# Patient Record
Sex: FEMALE | Race: WHITE | Employment: OTHER | URBAN - METROPOLITAN AREA
[De-identification: names, ages, dates, MRNs, and addresses within clinical notes are randomized per-mention and may not be internally consistent; named-entity substitution may affect disease eponyms.]

---

## 2018-01-24 ENCOUNTER — APPOINTMENT (OUTPATIENT)
Dept: GENERAL RADIOLOGY | Age: 78
DRG: 194 | End: 2018-01-24
Attending: EMERGENCY MEDICINE
Payer: MEDICARE

## 2018-01-24 ENCOUNTER — HOSPITAL ENCOUNTER (INPATIENT)
Age: 78
LOS: 2 days | Discharge: HOME HEALTH | DRG: 194 | End: 2018-01-26
Attending: EMERGENCY MEDICINE | Admitting: INTERNAL MEDICINE
Payer: MEDICARE

## 2018-01-24 DIAGNOSIS — J98.01 ACUTE BRONCHOSPASM: ICD-10-CM

## 2018-01-24 DIAGNOSIS — J06.9 ACUTE UPPER RESPIRATORY INFECTION: ICD-10-CM

## 2018-01-24 DIAGNOSIS — R09.02 HYPOXIA: Primary | ICD-10-CM

## 2018-01-24 LAB
ANION GAP SERPL CALC-SCNC: 13 MMOL/L (ref 3–18)
APPEARANCE UR: CLEAR
BASOPHILS # BLD: 0 K/UL (ref 0–0.06)
BASOPHILS NFR BLD: 0 % (ref 0–2)
BILIRUB UR QL: NEGATIVE
BUN SERPL-MCNC: 13 MG/DL (ref 7–18)
BUN/CREAT SERPL: 13 (ref 12–20)
CALCIUM SERPL-MCNC: 9.4 MG/DL (ref 8.5–10.1)
CHLORIDE SERPL-SCNC: 95 MMOL/L (ref 100–108)
CO2 SERPL-SCNC: 26 MMOL/L (ref 21–32)
COLOR UR: ABNORMAL
CREAT SERPL-MCNC: 0.99 MG/DL (ref 0.6–1.3)
DIFFERENTIAL METHOD BLD: ABNORMAL
EOSINOPHIL # BLD: 0.1 K/UL (ref 0–0.4)
EOSINOPHIL NFR BLD: 1 % (ref 0–5)
EPITH CASTS URNS QL MICRO: NORMAL /LPF (ref 0–5)
ERYTHROCYTE [DISTWIDTH] IN BLOOD BY AUTOMATED COUNT: 13.3 % (ref 11.6–14.5)
FLUAV AG NPH QL IA: POSITIVE
FLUBV AG NOSE QL IA: NEGATIVE
GLUCOSE SERPL-MCNC: 202 MG/DL (ref 74–99)
GLUCOSE UR STRIP.AUTO-MCNC: NEGATIVE MG/DL
HCT VFR BLD AUTO: 40.2 % (ref 35–45)
HGB BLD-MCNC: 13.3 G/DL (ref 12–16)
HGB UR QL STRIP: NEGATIVE
KETONES UR QL STRIP.AUTO: 40 MG/DL
LACTATE BLD-SCNC: 2.5 MMOL/L (ref 0.4–2)
LEUKOCYTE ESTERASE UR QL STRIP.AUTO: ABNORMAL
LYMPHOCYTES # BLD: 1.3 K/UL (ref 0.9–3.6)
LYMPHOCYTES NFR BLD: 15 % (ref 21–52)
MCH RBC QN AUTO: 26.8 PG (ref 24–34)
MCHC RBC AUTO-ENTMCNC: 33.1 G/DL (ref 31–37)
MCV RBC AUTO: 80.9 FL (ref 74–97)
MONOCYTES # BLD: 0.8 K/UL (ref 0.05–1.2)
MONOCYTES NFR BLD: 9 % (ref 3–10)
NEUTS SEG # BLD: 7 K/UL (ref 1.8–8)
NEUTS SEG NFR BLD: 75 % (ref 40–73)
NITRITE UR QL STRIP.AUTO: NEGATIVE
PH UR STRIP: 7.5 [PH] (ref 5–8)
PLATELET # BLD AUTO: 134 K/UL (ref 135–420)
PMV BLD AUTO: 13.1 FL (ref 9.2–11.8)
POTASSIUM SERPL-SCNC: 3.6 MMOL/L (ref 3.5–5.5)
PROT UR STRIP-MCNC: 30 MG/DL
RBC # BLD AUTO: 4.97 M/UL (ref 4.2–5.3)
RBC #/AREA URNS HPF: NORMAL /HPF (ref 0–5)
SODIUM SERPL-SCNC: 134 MMOL/L (ref 136–145)
SP GR UR REFRACTOMETRY: 1.02 (ref 1–1.03)
UROBILINOGEN UR QL STRIP.AUTO: 1 EU/DL (ref 0.2–1)
WBC # BLD AUTO: 9.2 K/UL (ref 4.6–13.2)
WBC URNS QL MICRO: NORMAL /HPF (ref 0–4)

## 2018-01-24 PROCEDURE — 99285 EMERGENCY DEPT VISIT HI MDM: CPT

## 2018-01-24 PROCEDURE — 81001 URINALYSIS AUTO W/SCOPE: CPT | Performed by: EMERGENCY MEDICINE

## 2018-01-24 PROCEDURE — 65660000000 HC RM CCU STEPDOWN

## 2018-01-24 PROCEDURE — 74011000250 HC RX REV CODE- 250: Performed by: EMERGENCY MEDICINE

## 2018-01-24 PROCEDURE — 80048 BASIC METABOLIC PNL TOTAL CA: CPT | Performed by: EMERGENCY MEDICINE

## 2018-01-24 PROCEDURE — 85025 COMPLETE CBC W/AUTO DIFF WBC: CPT | Performed by: EMERGENCY MEDICINE

## 2018-01-24 PROCEDURE — 87040 BLOOD CULTURE FOR BACTERIA: CPT | Performed by: EMERGENCY MEDICINE

## 2018-01-24 PROCEDURE — 83605 ASSAY OF LACTIC ACID: CPT

## 2018-01-24 PROCEDURE — 77030029684 HC NEB SM VOL KT MONA -A

## 2018-01-24 PROCEDURE — 96360 HYDRATION IV INFUSION INIT: CPT

## 2018-01-24 PROCEDURE — 74011250636 HC RX REV CODE- 250/636: Performed by: EMERGENCY MEDICINE

## 2018-01-24 PROCEDURE — 87804 INFLUENZA ASSAY W/OPTIC: CPT | Performed by: EMERGENCY MEDICINE

## 2018-01-24 PROCEDURE — 94640 AIRWAY INHALATION TREATMENT: CPT

## 2018-01-24 PROCEDURE — 71046 X-RAY EXAM CHEST 2 VIEWS: CPT

## 2018-01-24 PROCEDURE — 74011250637 HC RX REV CODE- 250/637: Performed by: EMERGENCY MEDICINE

## 2018-01-24 RX ORDER — IPRATROPIUM BROMIDE AND ALBUTEROL SULFATE 2.5; .5 MG/3ML; MG/3ML
3 SOLUTION RESPIRATORY (INHALATION) ONCE
Status: COMPLETED | OUTPATIENT
Start: 2018-01-24 | End: 2018-01-24

## 2018-01-24 RX ORDER — SODIUM CHLORIDE 9 MG/ML
75 INJECTION, SOLUTION INTRAVENOUS CONTINUOUS
Status: DISCONTINUED | OUTPATIENT
Start: 2018-01-24 | End: 2018-01-26 | Stop reason: HOSPADM

## 2018-01-24 RX ORDER — LOSARTAN POTASSIUM 25 MG/1
25 TABLET ORAL DAILY
COMMUNITY
End: 2021-11-14

## 2018-01-24 RX ORDER — OSELTAMIVIR PHOSPHATE 75 MG/1
75 CAPSULE ORAL
Status: COMPLETED | OUTPATIENT
Start: 2018-01-24 | End: 2018-01-24

## 2018-01-24 RX ORDER — ACETAMINOPHEN 325 MG/1
650 TABLET ORAL ONCE
Status: COMPLETED | OUTPATIENT
Start: 2018-01-24 | End: 2018-01-24

## 2018-01-24 RX ORDER — GLUCOSAMINE SULFATE 1500 MG
POWDER IN PACKET (EA) ORAL DAILY
COMMUNITY
End: 2021-11-14

## 2018-01-24 RX ORDER — SIMVASTATIN 5 MG/1
5 TABLET, FILM COATED ORAL
COMMUNITY
End: 2021-11-14

## 2018-01-24 RX ORDER — ALBUTEROL SULFATE 0.83 MG/ML
2.5 SOLUTION RESPIRATORY (INHALATION)
Status: COMPLETED | OUTPATIENT
Start: 2018-01-24 | End: 2018-01-24

## 2018-01-24 RX ADMIN — ACETAMINOPHEN 650 MG: 325 TABLET ORAL at 17:21

## 2018-01-24 RX ADMIN — IPRATROPIUM BROMIDE AND ALBUTEROL SULFATE 3 ML: .5; 3 SOLUTION RESPIRATORY (INHALATION) at 18:28

## 2018-01-24 RX ADMIN — SODIUM CHLORIDE 1000 ML: 900 INJECTION, SOLUTION INTRAVENOUS at 17:21

## 2018-01-24 RX ADMIN — OSELTAMIVIR PHOSPHATE 75 MG: 75 CAPSULE ORAL at 19:06

## 2018-01-24 RX ADMIN — ALBUTEROL SULFATE 2.5 MG: 2.5 SOLUTION RESPIRATORY (INHALATION) at 21:44

## 2018-01-24 RX ADMIN — METHYLPREDNISOLONE SODIUM SUCCINATE 125 MG: 125 INJECTION, POWDER, FOR SOLUTION INTRAMUSCULAR; INTRAVENOUS at 19:06

## 2018-01-24 RX ADMIN — SODIUM CHLORIDE 125 ML/HR: 9 INJECTION, SOLUTION INTRAVENOUS at 19:07

## 2018-01-24 NOTE — IP AVS SNAPSHOT
[address] Patient: Samantha Najera MRN: [id number] OGN:[birth year] A check bairon indicates which time of day the medication should be taken. My Medications START taking these medications Instructions Each Dose to Equal  
 Morning Noon Evening Bedtime  
 acetaminophen 500 mg tablet Commonly known as:  TYLENOL Your last dose was: Your next dose is: Take 1 Tab by mouth every four (4) hours as needed. Indications: Fever, Pain 500 mg  
    
   
   
   
  
 albuterol 90 mcg/actuation inhaler Commonly known as:  PROVENTIL HFA, VENTOLIN HFA, PROAIR HFA Your last dose was: Your next dose is:    
   
   
 2 PUFFS EVERY 4 HOURS while awake for 3 days then every 4 hours as needed for shortness of breath and/or wheezing  
     
   
   
   
  
 benzonatate 100 mg capsule Commonly known as:  TESSALON Your last dose was: Your next dose is: Take 1 Cap by mouth three (3) times daily as needed for Cough. 100 mg  
    
   
   
   
  
 budesonide-formoterol 80-4.5 mcg/actuation Hfaa Commonly known as:  SYMBICORT Your last dose was: Your next dose is: Take 2 Puffs by inhalation two (2) times a day. 2 Puff  
    
   
   
   
  
 ondansetron 4 mg disintegrating tablet Commonly known as:  ZOFRAN ODT Your last dose was: Your next dose is: Take 1 Tab by mouth every eight (8) hours as needed for Nausea. 4 mg  
    
   
   
   
  
 oseltamivir 75 mg capsule Commonly known as:  TAMIFLU Your last dose was: Your next dose is: Take 1 Cap by mouth two (2) times a day for 3 days. Indications: INFLUENZA 75 mg CONTINUE taking these medications Instructions Each Dose to Equal  
 Morning Noon Evening Bedtime cholecalciferol 1,000 unit Cap Commonly known as:  VITAMIN D3 Your last dose was: Your next dose is: Take  by mouth daily. losartan 25 mg tablet Commonly known as:  COZAAR Your last dose was: Your next dose is: Take 25 mg by mouth daily. Indications: hypertension 25 mg METFORMIN PO Your last dose was: Your next dose is: Take  by mouth. OTHER Your last dose was: Your next dose is:    
   
   
 Cholesterol med  
     
   
   
   
  
 simvastatin 5 mg tablet Commonly known as:  ZOCOR Your last dose was: Your next dose is: Take 5 mg by mouth nightly. 5 mg Where to Get Your Medications Information on where to get these meds will be given to you by the nurse or doctor. ! Ask your nurse or doctor about these medications  
  acetaminophen 500 mg tablet  
 albuterol 90 mcg/actuation inhaler  
 benzonatate 100 mg capsule  
 budesonide-formoterol 80-4.5 mcg/actuation Hfaa  
 ondansetron 4 mg disintegrating tablet  
 oseltamivir 75 mg capsule

## 2018-01-24 NOTE — ED PROVIDER NOTES
EMERGENCY DEPARTMENT HISTORY AND PHYSICAL EXAM    4:49 PM      Date: 1/24/2018  Patient Name: Carolina Manrique    History of Presenting Illness     Chief Complaint   Patient presents with    Shortness of Breath         History Provided By: Patient and EMS    Chief Complaint: Emesis  Duration: 18 Hours  Timing:  Constant  Location:   Quality: Multiple episodic  Severity: Mild  Modifying Factors: could not get out of bed  Associated Symptoms: denies any other associated signs or symptoms      Additional History (Context): Carolina Manrique is a 78 y.o. female with hx of asthma, DM, and HTN who presents to ED via EMT with c/o constant mild multiple episodic emesis onset 18 hours. Per EMT, pt had multiple episodes of emesis last night which started with SOB. EMT reports noticeable pt wheezing upon arrival and gave 1 Albuterol Neb and 1 Duo Neb. Pt denies any associated sx including cough, rhinorrhea, difficulty breathing, or pain. Pt states she fell out of bed last night and could not get up. Family at beside state pt has been complaining of cold sx, chest congestion, and cough onset 5 days and today a neighbor noticed the pt was weak and lethargic and called EMS. Pt continues to deny specific complaints but family states pt appears more weak than baseline. Allergy to ASA. PCP: Ladarius Lacy MD        Past History     Past Medical History:  Past Medical History:   Diagnosis Date    Asthma     Diabetes ([address]     Hypertension        Past Surgical History:  Past Surgical History:   Procedure Laterality Date    HX CHOLECYSTECTOMY         Family History:  History reviewed. No pertinent family history. Social History:  Social History   Substance Use Topics    Smoking status: None    Smokeless tobacco: None    Alcohol use None       Allergies: Allergies   Allergen Reactions    Aspirin Anaphylaxis         Review of Systems       Review of Systems   Constitutional: Negative for fever.    HENT: Negative for rhinorrhea. Eyes: Negative for visual disturbance. Respiratory: Positive for wheezing. Negative for cough and shortness of breath. Cardiovascular: Negative for chest pain and leg swelling. Gastrointestinal: Negative for abdominal pain, blood in stool and vomiting. Genitourinary: Negative for dysuria and flank pain. Musculoskeletal: Negative for arthralgias and neck pain. Skin: Negative for rash. Neurological: Negative for headaches. Hematological: Does not bruise/bleed easily. Psychiatric/Behavioral: Negative for confusion. All other systems reviewed and are negative. Physical Exam     Visit Vitals    /56    Pulse 92    Temp (!) 102 °F (38.9 °C)    Resp 18    Ht 4' 11\" (1.499 m)    Wt 70.8 kg (156 lb)    SpO2 97%    BMI 31.51 kg/m2         Physical Exam   Constitutional: She is oriented to person, place, and time. She appears well-developed and well-nourished. No distress. HENT:   Head: Normocephalic and atraumatic. Right Ear: External ear normal.   Left Ear: External ear normal.   Nose: Nose normal.   Mouth/Throat: Oropharynx is clear and moist.   Eyes: Conjunctivae and EOM are normal. Pupils are equal, round, and reactive to light. No scleral icterus. Neck: Normal range of motion. Neck supple. No JVD present. No tracheal deviation present. No thyromegaly present. Cardiovascular: Regular rhythm, normal heart sounds and intact distal pulses. Tachycardia present. Exam reveals no gallop and no friction rub. No murmur heard. Pulmonary/Chest: Effort normal. She has wheezes (scattered at bilateral bases). She has rhonchi (scattered at bilateral bases). She exhibits no tenderness. Abdominal: Soft. Bowel sounds are normal. She exhibits no distension. There is no tenderness. There is no rebound and no guarding. Musculoskeletal: Normal range of motion. She exhibits no edema or tenderness. Lymphadenopathy:     She has no cervical adenopathy.    Neurological: She is alert and oriented to person, place, and time. No cranial nerve deficit. Coordination normal.   No sensory loss, Gait normal, Motor 5/5   Skin: Skin is warm and dry. Psychiatric: She has a normal mood and affect. Her behavior is normal. Judgment and thought content normal.   Nursing note and vitals reviewed. Diagnostic Study Results     Labs -  Recent Results (from the past 12 hour(s))   METABOLIC PANEL, BASIC    Collection Time: 01/24/18  5:15 PM   Result Value Ref Range    Sodium 134 (L) 136 - 145 mmol/L    Potassium 3.6 3.5 - 5.5 mmol/L    Chloride 95 (L) 100 - 108 mmol/L    CO2 26 21 - 32 mmol/L    Anion gap 13 3.0 - 18 mmol/L    Glucose 202 (H) 74 - 99 mg/dL    BUN 13 7.0 - 18 MG/DL    Creatinine 0.99 0.6 - 1.3 MG/DL    BUN/Creatinine ratio 13 12 - 20      GFR est AA >60 >60 ml/min/1.73m2    GFR est non-AA 55 (L) >60 ml/min/1.73m2    Calcium 9.4 8.5 - 10.1 MG/DL   CBC WITH AUTOMATED DIFF    Collection Time: 01/24/18  5:15 PM   Result Value Ref Range    WBC 9.2 4.6 - 13.2 K/uL    RBC 4.97 4.20 - 5.30 M/uL    HGB 13.3 12.0 - 16.0 g/dL    HCT 40.2 35.0 - 45.0 %    MCV 80.9 74.0 - 97.0 FL    MCH 26.8 24.0 - 34.0 PG    MCHC 33.1 31.0 - 37.0 g/dL    RDW 13.3 11.6 - 14.5 %    PLATELET 718 (L) 835 - 420 K/uL    MPV 13.1 (H) 9.2 - 11.8 FL    NEUTROPHILS 75 (H) 40 - 73 %    LYMPHOCYTES 15 (L) 21 - 52 %    MONOCYTES 9 3 - 10 %    EOSINOPHILS 1 0 - 5 %    BASOPHILS 0 0 - 2 %    ABS. NEUTROPHILS 7.0 1.8 - 8.0 K/UL    ABS. LYMPHOCYTES 1.3 0.9 - 3.6 K/UL    ABS. MONOCYTES 0.8 0.05 - 1.2 K/UL    ABS. EOSINOPHILS 0.1 0.0 - 0.4 K/UL    ABS.  BASOPHILS 0.0 0.0 - 0.06 K/UL    DF AUTOMATED     POC LACTIC ACID    Collection Time: 01/24/18  5:30 PM   Result Value Ref Range    Lactic Acid (POC) 2.5 (HH) 0.4 - 2.0 mmol/L       Radiologic Studies -   XR CHEST AP LAT   Final Result   IMPRESSIONS:     Moderate bronchitis.     Mild streaky atelectasis or fibrotic changes in left lung base, with or without  subtle infiltrates.     No evidence of confluent pneumonia, confluent pulmonary atelectasis or pleural  effusion.           Normal cardiac size, without evidence of CHF. Medical Decision Making   I am the first provider for this patient. I reviewed the vital signs, available nursing notes, past medical history, past surgical history, family history and social history. Vital Signs-Reviewed the patient's vital signs. Cardiac Monitor:  Rate: 106 bpm  Rhythm:  Sinus Tachycardia     ED Course:  6:36 PM Consult: I discussed care with Dr. Jarvis Peña (Hospitalist). It was a standard discussion including patient history, chief complaint, available diagnostic results, and predicted treatment course. Agrees with admission. Records Reviewed: Nursing Notes (Time of Review: 4:46 PM)    Provider Notes (Medical Decision Making): viral vs bacterial pneumonia: most likely viral    For Hospitalized Patients:    1. Hospitalization Decision Time:  The decision to hospitalize the patient was made by Dr. Gillian Frye at 6:35 PM on 1/24/2018    2. Aspirin: Aspirin was not given because the patient is allergic    Diagnosis     Clinical Impression: No diagnosis found. Disposition: Admit    Follow-up Information     None           Patient's Medications   Start Taking    No medications on file   Continue Taking    CHOLECALCIFEROL (VITAMIN D3) 1,000 UNIT CAP    Take  by mouth daily. METFORMIN HCL (METFORMIN PO)    Take  by mouth.     OTHER    Cholesterol med   These Medications have changed    No medications on file   Stop Taking    No medications on file     _______________________________    Attestations:  [id number] acting as a scribe for and in the presence of Arnaud Oreilly MD      January 24, 2018 at 4:46 PM       Provider Attestation:      I personally performed the services described in the documentation, reviewed the documentation, as recorded by the scribe in my presence, and it accurately and completely records my words and actions. January 24, 2018 at 4:46 PM - Bozena Chavez MD    _______________________________  PT has persistant hypoxia and bronchospasm, CXR shows no pneumonia and likely viral URI. PT is feeling much better after IVF and O2 supplement. Will admit her due to continued need for O2 supplement. Pt is not septic at this time. This appears consistent with a viral URI and exacerbation of asthma. I've discussed this with hospitalist who agrees with this plan.   Bozena Chavez MD  6:48 PM

## 2018-01-24 NOTE — IP AVS SNAPSHOT
[address] [address] Vani Tyson Patient: Keyshawn Pires MRN: [id number] PBS:[id number] About your hospitalization You were admitted on:  January 24, 2018 You last received care in the:  [address] [place] [address] You were discharged on:  January 26, 2018 Why you were hospitalized Your primary diagnosis was:  Not on File Your diagnoses also included:  Bronchospasm, Hypoxia, Influenza A (H5n1) Follow-up Information Follow up With Details Comments Contact Info Odalys Brumfield MD On 1/30/2018 @1100am Mary[address] [facility] 
[phone] Your Scheduled Appointments Tuesday January 30, 2018 11:30 AM EST Office Visit with Odalys Brumfield MD  
[facility], [facility] [address] [place] [address]  
[phone] Wednesday January 31, 2018  1:00 PM EST New Patient with MD Kelsea Kee ([facility] [address] [place] [address]  
[phone] Thursday February 01, 2018  9:30 AM EST  
DEXA BONE DENSITY STDY AXIAL with HBV BONE DEXA RM 1 HBV RAD BONE DENSITY ([facility]) [address] [facility] [address] [id number] [address] [address] [phone] OUTSIDE FILMS  - Any outside films related to the study being scheduled should be brought with you on the day of the exam.  If this cannot be done there may be a delay in the reading of the study. MEDICATIONS  - Patient must bring a complete list of all medications currently taking to include prescriptions, over-the-counter meds, herbals, vitamins & any dietary supplements - Patient must discontinue use of calcium, vitamins, or calcium supplements including antacids (calcium based) 24 hours before scan.   GENERAL INSTRUCTIONS  - [facility] cannot accommodate patients on stretchers, patient must be able to walk into the room and be able to sit up for a portion of the exam.  
  
    
CHECK IN INSTRUCTIONS  Check in to Registration desk 30 minutes prior to your appointment. [address] [facility] [address] (located on the second floor) Sony [address]ru[address], [address] Discharge Orders None A check bairon indicates which time of day the medication should be taken. My Medications START taking these medications Instructions Each Dose to Equal  
 Morning Noon Evening Bedtime  
 acetaminophen 500 mg tablet Commonly known as:  TYLENOL Your last dose was: Your next dose is: Take 1 Tab by mouth every four (4) hours as needed. Indications: Fever, Pain 500 mg  
    
   
   
   
  
 albuterol 90 mcg/actuation inhaler Commonly known as:  PROVENTIL HFA, VENTOLIN HFA, PROAIR HFA Your last dose was: Your next dose is:    
   
   
 2 PUFFS EVERY 4 HOURS while awake for 3 days then every 4 hours as needed for shortness of breath and/or wheezing  
     
   
   
   
  
 benzonatate 100 mg capsule Commonly known as:  TESSALON Your last dose was: Your next dose is: Take 1 Cap by mouth three (3) times daily as needed for Cough. 100 mg  
    
   
   
   
  
 budesonide-formoterol 80-4.5 mcg/actuation Hfaa Commonly known as:  SYMBICORT Your last dose was: Your next dose is: Take 2 Puffs by inhalation two (2) times a day. 2 Puff  
    
   
   
   
  
 ondansetron 4 mg disintegrating tablet Commonly known as:  ZOFRAN ODT Your last dose was: Your next dose is: Take 1 Tab by mouth every eight (8) hours as needed for Nausea. 4 mg  
    
   
   
   
  
 oseltamivir 75 mg capsule Commonly known as:  TAMIFLU Your last dose was: Your next dose is: Take 1 Cap by mouth two (2) times a day for 3 days. Indications: INFLUENZA 75 mg CONTINUE taking these medications Instructions Each Dose to Equal  
 Morning Noon Evening Bedtime  
 cholecalciferol 1,000 unit Cap Commonly known as:  VITAMIN D3 Your last dose was: Your next dose is: Take  by mouth daily. losartan 25 mg tablet Commonly known as:  COZAAR Your last dose was: Your next dose is: Take 25 mg by mouth daily. Indications: hypertension 25 mg METFORMIN PO Your last dose was: Your next dose is: Take  by mouth. OTHER Your last dose was: Your next dose is:    
   
   
 Cholesterol med  
     
   
   
   
  
 simvastatin 5 mg tablet Commonly known as:  ZOCOR Your last dose was: Your next dose is: Take 5 mg by mouth nightly. 5 mg Where to Get Your Medications Information on where to get these meds will be given to you by the nurse or doctor. ! Ask your nurse or doctor about these medications  
  acetaminophen 500 mg tablet  
 albuterol 90 mcg/actuation inhaler  
 benzonatate 100 mg capsule  
 budesonide-formoterol 80-4.5 mcg/actuation Hfaa  
 ondansetron 4 mg disintegrating tablet  
 oseltamivir 75 mg capsule Discharge Instructions Influenza (Flu): Care Instructions Your Care Instructions Influenza (flu) is an infection in the lungs and breathing passages. It is caused by the influenza virus. There are different strains, or types, of the flu virus from year to year. Unlike the common cold, the flu comes on suddenly and the symptoms, such as a cough, congestion, fever, chills, fatigue, aches, and pains, are more severe.  These symptoms may last up to 10 days. Although the flu can make you feel very sick, it usually doesn't cause serious health problems. Home treatment is usually all you need for flu symptoms. But your doctor may prescribe antiviral medicine to prevent other health problems, such as pneumonia, from developing. Older people and those who have a long-term health condition, such as lung disease, are most at risk for having pneumonia or other health problems. Follow-up care is a key part of your treatment and safety. Be sure to make and go to all appointments, and call your doctor if you are having problems. It's also a good idea to know your test results and keep a list of the medicines you take. How can you care for yourself at home? · Get plenty of rest. 
· Drink plenty of fluids, enough so that your urine is light yellow or clear like water. If you have kidney, heart, or liver disease and have to limit fluids, talk with your doctor before you increase the amount of fluids you drink. · Take an over-the-counter pain medicine if needed, such as acetaminophen (Tylenol), ibuprofen (Advil, Motrin), or naproxen (Aleve), to relieve fever, headache, and muscle aches. Read and follow all instructions on the label. No one younger than 20 should take aspirin. It has been linked to Reye syndrome, a serious illness. · Do not smoke. Smoking can make the flu worse. If you need help quitting, talk to your doctor about stop-smoking programs and medicines. These can increase your chances of quitting for good. · Breathe moist air from a hot shower or from a sink filled with hot water to help clear a stuffy nose. · Before you use cough and cold medicines, check the label. These medicines may not be safe for young children or for people with certain health problems. · If the skin around your nose and lips becomes sore, put some petroleum jelly on the area. · To ease coughing: ¨ Drink fluids to soothe a scratchy throat. ¨ Suck on cough drops or plain hard candy. ¨ Take an over-the-counter cough medicine that contains dextromethorphan to help you get some sleep. Read and follow all instructions on the label. ¨ Raise your head at night with an extra pillow. This may help you rest if coughing keeps you awake. · Take any prescribed medicine exactly as directed. Call your doctor if you think you are having a problem with your medicine. To avoid spreading the flu · Wash your hands regularly, and keep your hands away from your face. · Stay home from school, work, and other public places until you are feeling better and your fever has been gone for at least 24 hours. The fever needs to have gone away on its own without the help of medicine. · Ask people living with you to talk to their doctors about preventing the flu. They may get antiviral medicine to keep from getting the flu from you. · To prevent the flu in the future, get a flu vaccine every fall. Encourage people living with you to get the vaccine. · Cover your mouth when you cough or sneeze. When should you call for help? Call 911 anytime you think you may need emergency care. For example, call if: 
? · You have severe trouble breathing. ?Call your doctor now or seek immediate medical care if: 
? · You have new or worse trouble breathing. ? · You seem to be getting much sicker. ? · You feel very sleepy or confused. ? · You have a new or higher fever. ? · You get a new rash. ? Watch closely for changes in your health, and be sure to contact your doctor if: 
? · You begin to get better and then get worse. ? · You are not getting better after 1 week. Where can you learn more? Go to http://jacqueline-sharon.info/. Enter K518 in the search box to learn more about \"Influenza (Flu): Care Instructions. \" Current as of: May 12, 2017 Content Version: 11.4 © 6716-7314 Healthwise, Incorporated.  Care instructions adapted under license by [address] (which disclaims liability or warranty for this information). If you have questions about a medical condition or this instruction, always ask your healthcare professional. [address] any warranty or liability for your use of this information. DISCHARGE SUMMARY from Nurse PATIENT INSTRUCTIONS: 
 
 
F-face looks uneven A-arms unable to move or move unevenly S-speech slurred or non-existent T-time-call 911 as soon as signs and symptoms begin-DO NOT go Back to bed or wait to see if you get better-TIME IS BRAIN. Warning Signs of HEART ATTACK Call 911 if you have these symptoms: 
? Chest discomfort. Most heart attacks involve discomfort in the center of the chest that lasts more than a few minutes, or that goes away and comes back. It can feel like uncomfortable pressure, squeezing, fullness, or pain. ? Discomfort in other areas of the upper body. Symptoms can include pain or discomfort in one or both arms, the back, neck, jaw, or stomach. ? Shortness of breath with or without chest discomfort. ? Other signs may include breaking out in a cold sweat, nausea, or lightheadedness. Don't wait more than five minutes to call [address]! Fast action can save your life. Calling 911 is almost always the fastest way to get lifesaving treatment. Emergency Medical Services staff can begin treatment when they arrive  up to an hour sooner than if someone gets to the hospital by car. The discharge information has been reviewed with the patient. The patient verbalized understanding.  
Discharge medications reviewed with the patient and appropriate educational materials and side effects teaching were provided. Patient armband removed and shredded. ___________________________________________________________________________________________________________________________________ MyChart Announcement We are excited to announce that we are making your provider's discharge notes available to you in Cytoguide. You will see these notes when they are completed and signed by the physician that discharged you from your recent hospital stay. If you have any questions or concerns about any information you see in PerMicrot, please call the Health Information Department where you were seen or reach out to your Primary Care Provider for more information about your plan of care. Introducing [facility]! [employer] introduces Cytoguide patient portal. Now you can access parts of your medical record, email your doctor's office, and request medication refills online. 1. In your internet browser, go to [email or web address] [employer]/Waitsupt 2. Click on the First Time User? Click Here link in the Sign In box. You will see the New Member Sign Up page. 3. Enter your Cytoguide Access Code exactly as it appears below. You will not need to use this code after youve completed the sign-up process. If you do not sign up before the expiration date, you must request a new code. · Cytoguide Access Code: [email or web address] Expires: 4/25/2018  9:14 AM 
 
4. Enter the last four digits of your Social Security Number (xxxx) and Date of Birth (mm/dd/yyyy) as indicated and click Submit. You will be taken to the next sign-up page. 5. Create a Cytoguide ID. This will be your Cytoguide login ID and cannot be changed, so think of one that is secure and easy to remember. 6. Create a MOBEXOharMarkerly password. You can change your password at any time. 7. Enter your Password Reset Question and Answer. This can be used at a later time if you forget your password. 8. Enter your e-mail address. You will receive e-mail notification when new information is available in [address]. 9. Click Sign Up. You can now view and download portions of your medical record. 10. Click the Download Summary menu link to download a portable copy of your medical information. If you have questions, please visit the Frequently Asked Questions section of the [employer] website. Remember, DediServe is NOT to be used for urgent needs. For medical emergencies, dial 911. Now available from your iPhone and Android! Unresulted Labs-Please follow up with your PCP about these lab tests Order Current Status CULTURE, BLOOD Preliminary result Providers Seen During Your Hospitalization Provider Specialty Primary office phone Russel Torres MD Emergency Medicine [phone] Angela Lopez MD Internal Medicine [phone] Tristen Young MD Internal Medicine [phone] Your Primary Care Physician (PCP) Primary Care Physician Office Phone Office Fax OTHER, PHYS ** None ** ** None ** You are allergic to the following Allergen Reactions Aspirin Anaphylaxis Recent Documentation Height Weight BMI Smoking Status 1.499 m 70.2 kg 31.25 kg/m2 Never Assessed Emergency Contacts Name Discharge Info Relation Home Work [employer] ( Please Call First Granddaughter) DISCHARGE CAREGIVER [3] Other Relative [6]  [phone] Denise Rowland NO [2] Daughter [21] [phone] Kristyn Harley NO [2] Daughter [21] [phone] Patient Belongings The following personal items are in your possession at time of discharge: 
  Dental Appliances: None  Visual Aid: Glasses, With patient      Home Medications: None   Jewelry: None  Clothing: Pajamas, Pants, Shirt, Socks, Sweater, Undergarments, Footwear, At bedside    Other Valuables: Britney, IdenIve Discharge Instructions Attachments/References INFLUENZA (ENGLISH) Patient Handouts Influenza (Flu): Care Instructions Your Care Instructions Influenza (flu) is an infection in the lungs and breathing passages. It is caused by the influenza virus. There are different strains, or types, of the flu virus from year to year. Unlike the common cold, the flu comes on suddenly and the symptoms, such as a cough, congestion, fever, chills, fatigue, aches, and pains, are more severe. These symptoms may last up to 10 days. Although the flu can make you feel very sick, it usually doesn't cause serious health problems. Home treatment is usually all you need for flu symptoms. But your doctor may prescribe antiviral medicine to prevent other health problems, such as pneumonia, from developing. Older people and those who have a long-term health condition, such as lung disease, are most at risk for having pneumonia or other health problems. Follow-up care is a key part of your treatment and safety. Be sure to make and go to all appointments, and call your doctor if you are having problems. It's also a good idea to know your test results and keep a list of the medicines you take. How can you care for yourself at home? · Get plenty of rest. 
· Drink plenty of fluids, enough so that your urine is light yellow or clear like water. If you have kidney, heart, or liver disease and have to limit fluids, talk with your doctor before you increase the amount of fluids you drink. · Take an over-the-counter pain medicine if needed, such as acetaminophen (Tylenol), ibuprofen (Advil, Motrin), or naproxen (Aleve), to relieve fever, headache, and muscle aches. Read and follow all instructions on the label. No one younger than 20 should take aspirin. It has been linked to Reye syndrome, a serious illness. · Do not smoke. Smoking can make the flu worse.  If you need help quitting, talk to your doctor about stop-smoking programs and medicines. These can increase your chances of quitting for good. · Breathe moist air from a hot shower or from a sink filled with hot water to help clear a stuffy nose. · Before you use cough and cold medicines, check the label. These medicines may not be safe for young children or for people with certain health problems. · If the skin around your nose and lips becomes sore, put some petroleum jelly on the area. · To ease coughing: ¨ Drink fluids to soothe a scratchy throat. ¨ Suck on cough drops or plain hard candy. ¨ Take an over-the-counter cough medicine that contains dextromethorphan to help you get some sleep. Read and follow all instructions on the label. ¨ Raise your head at night with an extra pillow. This may help you rest if coughing keeps you awake. · Take any prescribed medicine exactly as directed. Call your doctor if you think you are having a problem with your medicine. To avoid spreading the flu · Wash your hands regularly, and keep your hands away from your face. · Stay home from school, work, and other public places until you are feeling better and your fever has been gone for at least 24 hours. The fever needs to have gone away on its own without the help of medicine. · Ask people living with you to talk to their doctors about preventing the flu. They may get antiviral medicine to keep from getting the flu from you. · To prevent the flu in the future, get a flu vaccine every fall. Encourage people living with you to get the vaccine. · Cover your mouth when you cough or sneeze. When should you call for help? Call 911 anytime you think you may need emergency care. For example, call if: 
? · You have severe trouble breathing. ?Call your doctor now or seek immediate medical care if: 
? · You have new or worse trouble breathing. ? · You seem to be getting much sicker. ? · You feel very sleepy or confused. ? · You have a new or higher fever. ? · You get a new rash. ? Watch closely for changes in your health, and be sure to contact your doctor if: 
? · You begin to get better and then get worse. ? · You are not getting better after 1 week. Where can you learn more? Go to http://jacqueline-sharon.info/. Enter J355 in the search box to learn more about \"Influenza (Flu): Care Instructions. \" Current as of: May 12, 2017 Content Version: 11.4 © 2110-6666 Healthwise, Incorporated. Care instructions adapted under license by [employer] (which disclaims liability or warranty for this information). If you have questions about a medical condition or this instruction, always ask your healthcare professional. [address] any warranty or liability for your use of this information. Please provide this summary of care documentation to your next provider. Signatures-by signing, you are acknowledging that this After Visit Summary has been reviewed with you and you have received a copy. Patient Signature:  ____________________________________________________________ Date:  ____________________________________________________________  
  
Lona Carey Provider Signature:  ____________________________________________________________ Date:  ____________________________________________________________

## 2018-01-24 NOTE — ED TRIAGE NOTES
Pt had multiple  Episodes of vomiting last pm,  This am  Started with sob  Per EMS pt with noted sob  On their arrival  Pt given one alb neb and one duoneb , pt states feeling relief with nebs

## 2018-01-25 PROBLEM — J09.X2 INFLUENZA A (H5N1): Status: ACTIVE | Noted: 2018-01-25

## 2018-01-25 LAB
ALBUMIN SERPL-MCNC: 3 G/DL (ref 3.4–5)
ALBUMIN/GLOB SERPL: 0.8 {RATIO} (ref 0.8–1.7)
ALP SERPL-CCNC: 53 U/L (ref 45–117)
ALT SERPL-CCNC: 26 U/L (ref 13–56)
ANION GAP SERPL CALC-SCNC: 9 MMOL/L (ref 3–18)
AST SERPL-CCNC: 21 U/L (ref 15–37)
BASOPHILS # BLD: 0 K/UL (ref 0–0.1)
BASOPHILS NFR BLD: 0 % (ref 0–2)
BILIRUB SERPL-MCNC: 0.5 MG/DL (ref 0.2–1)
BUN SERPL-MCNC: 16 MG/DL (ref 7–18)
BUN/CREAT SERPL: 18 (ref 12–20)
CALCIUM SERPL-MCNC: 8 MG/DL (ref 8.5–10.1)
CHLORIDE SERPL-SCNC: 103 MMOL/L (ref 100–108)
CO2 SERPL-SCNC: 24 MMOL/L (ref 21–32)
CREAT SERPL-MCNC: 0.9 MG/DL (ref 0.6–1.3)
DIFFERENTIAL METHOD BLD: ABNORMAL
EOSINOPHIL # BLD: 0 K/UL (ref 0–0.4)
EOSINOPHIL NFR BLD: 0 % (ref 0–5)
ERYTHROCYTE [DISTWIDTH] IN BLOOD BY AUTOMATED COUNT: 13.2 % (ref 11.6–14.5)
EST. AVERAGE GLUCOSE BLD GHB EST-MCNC: 160 MG/DL
GLOBULIN SER CALC-MCNC: 3.7 G/DL (ref 2–4)
GLUCOSE BLD STRIP.AUTO-MCNC: 104 MG/DL (ref 70–110)
GLUCOSE BLD STRIP.AUTO-MCNC: 112 MG/DL (ref 70–110)
GLUCOSE BLD STRIP.AUTO-MCNC: 336 MG/DL (ref 70–110)
GLUCOSE BLD STRIP.AUTO-MCNC: 70 MG/DL (ref 70–110)
GLUCOSE SERPL-MCNC: 383 MG/DL (ref 74–99)
HBA1C MFR BLD: 7.2 % (ref 4.2–5.6)
HCT VFR BLD AUTO: 33.5 % (ref 35–45)
HGB BLD-MCNC: 11 G/DL (ref 12–16)
LACTATE SERPL-SCNC: 3 MMOL/L (ref 0.4–2)
LYMPHOCYTES # BLD: 0.4 K/UL (ref 0.9–3.6)
LYMPHOCYTES NFR BLD: 6 % (ref 21–52)
MAGNESIUM SERPL-MCNC: 1.6 MG/DL (ref 1.6–2.6)
MCH RBC QN AUTO: 26.8 PG (ref 24–34)
MCHC RBC AUTO-ENTMCNC: 32.8 G/DL (ref 31–37)
MCV RBC AUTO: 81.7 FL (ref 74–97)
MONOCYTES # BLD: 0.1 K/UL (ref 0.05–1.2)
MONOCYTES NFR BLD: 1 % (ref 3–10)
NEUTS SEG # BLD: 5.2 K/UL (ref 1.8–8)
NEUTS SEG NFR BLD: 93 % (ref 40–73)
PHOSPHATE SERPL-MCNC: 2.8 MG/DL (ref 2.5–4.9)
PLATELET # BLD AUTO: 129 K/UL (ref 135–420)
PMV BLD AUTO: 13.3 FL (ref 9.2–11.8)
POTASSIUM SERPL-SCNC: 3.1 MMOL/L (ref 3.5–5.5)
PROT SERPL-MCNC: 6.7 G/DL (ref 6.4–8.2)
RBC # BLD AUTO: 4.1 M/UL (ref 4.2–5.3)
SODIUM SERPL-SCNC: 136 MMOL/L (ref 136–145)
WBC # BLD AUTO: 5.6 K/UL (ref 4.6–13.2)

## 2018-01-25 PROCEDURE — 97161 PT EVAL LOW COMPLEX 20 MIN: CPT

## 2018-01-25 PROCEDURE — 80053 COMPREHEN METABOLIC PANEL: CPT | Performed by: INTERNAL MEDICINE

## 2018-01-25 PROCEDURE — 74011636637 HC RX REV CODE- 636/637: Performed by: INTERNAL MEDICINE

## 2018-01-25 PROCEDURE — 74011250636 HC RX REV CODE- 250/636: Performed by: INTERNAL MEDICINE

## 2018-01-25 PROCEDURE — 74011250637 HC RX REV CODE- 250/637: Performed by: INTERNAL MEDICINE

## 2018-01-25 PROCEDURE — 36415 COLL VENOUS BLD VENIPUNCTURE: CPT | Performed by: INTERNAL MEDICINE

## 2018-01-25 PROCEDURE — 94640 AIRWAY INHALATION TREATMENT: CPT

## 2018-01-25 PROCEDURE — 85025 COMPLETE CBC W/AUTO DIFF WBC: CPT | Performed by: INTERNAL MEDICINE

## 2018-01-25 PROCEDURE — 84100 ASSAY OF PHOSPHORUS: CPT | Performed by: INTERNAL MEDICINE

## 2018-01-25 PROCEDURE — 74011000250 HC RX REV CODE- 250: Performed by: INTERNAL MEDICINE

## 2018-01-25 PROCEDURE — 83036 HEMOGLOBIN GLYCOSYLATED A1C: CPT | Performed by: INTERNAL MEDICINE

## 2018-01-25 PROCEDURE — 77030009834 HC MSK O2 TRACH VYRM -A

## 2018-01-25 PROCEDURE — 83605 ASSAY OF LACTIC ACID: CPT | Performed by: INTERNAL MEDICINE

## 2018-01-25 PROCEDURE — 82962 GLUCOSE BLOOD TEST: CPT

## 2018-01-25 PROCEDURE — 65660000000 HC RM CCU STEPDOWN

## 2018-01-25 PROCEDURE — 97116 GAIT TRAINING THERAPY: CPT

## 2018-01-25 PROCEDURE — 83735 ASSAY OF MAGNESIUM: CPT | Performed by: INTERNAL MEDICINE

## 2018-01-25 RX ORDER — DEXTROSE 50 % IN WATER (D50W) INTRAVENOUS SYRINGE
25-50 AS NEEDED
Status: DISCONTINUED | OUTPATIENT
Start: 2018-01-25 | End: 2018-01-26 | Stop reason: HOSPADM

## 2018-01-25 RX ORDER — BISACODYL 5 MG
5 TABLET, DELAYED RELEASE (ENTERIC COATED) ORAL DAILY PRN
Status: DISCONTINUED | OUTPATIENT
Start: 2018-01-25 | End: 2018-01-26 | Stop reason: HOSPADM

## 2018-01-25 RX ORDER — SODIUM CHLORIDE 0.9 % (FLUSH) 0.9 %
5-10 SYRINGE (ML) INJECTION EVERY 8 HOURS
Status: DISCONTINUED | OUTPATIENT
Start: 2018-01-25 | End: 2018-01-26 | Stop reason: HOSPADM

## 2018-01-25 RX ORDER — LOSARTAN POTASSIUM 25 MG/1
25 TABLET ORAL DAILY
Status: DISCONTINUED | OUTPATIENT
Start: 2018-01-25 | End: 2018-01-26 | Stop reason: HOSPADM

## 2018-01-25 RX ORDER — OSELTAMIVIR PHOSPHATE 75 MG/1
75 CAPSULE ORAL 2 TIMES DAILY
Status: DISCONTINUED | OUTPATIENT
Start: 2018-01-25 | End: 2018-01-25 | Stop reason: DRUGHIGH

## 2018-01-25 RX ORDER — ONDANSETRON 4 MG/1
4 TABLET, ORALLY DISINTEGRATING ORAL
Status: DISCONTINUED | OUTPATIENT
Start: 2018-01-25 | End: 2018-01-26 | Stop reason: HOSPADM

## 2018-01-25 RX ORDER — GLIPIZIDE 5 MG/1
5 TABLET ORAL
Status: DISCONTINUED | OUTPATIENT
Start: 2018-01-25 | End: 2018-01-26 | Stop reason: HOSPADM

## 2018-01-25 RX ORDER — BUDESONIDE AND FORMOTEROL FUMARATE DIHYDRATE 80; 4.5 UG/1; UG/1
2 AEROSOL RESPIRATORY (INHALATION)
Status: DISCONTINUED | OUTPATIENT
Start: 2018-01-25 | End: 2018-01-26 | Stop reason: HOSPADM

## 2018-01-25 RX ORDER — MORPHINE SULFATE 2 MG/ML
1 INJECTION, SOLUTION INTRAMUSCULAR; INTRAVENOUS
Status: DISCONTINUED | OUTPATIENT
Start: 2018-01-25 | End: 2018-01-25

## 2018-01-25 RX ORDER — MAGNESIUM SULFATE HEPTAHYDRATE 40 MG/ML
2 INJECTION, SOLUTION INTRAVENOUS ONCE
Status: COMPLETED | OUTPATIENT
Start: 2018-01-25 | End: 2018-01-25

## 2018-01-25 RX ORDER — BENZONATATE 100 MG/1
100 CAPSULE ORAL
Status: DISCONTINUED | OUTPATIENT
Start: 2018-01-25 | End: 2018-01-26 | Stop reason: HOSPADM

## 2018-01-25 RX ORDER — HYDROCODONE BITARTRATE AND ACETAMINOPHEN 5; 325 MG/1; MG/1
1 TABLET ORAL
Status: DISCONTINUED | OUTPATIENT
Start: 2018-01-25 | End: 2018-01-25

## 2018-01-25 RX ORDER — MELATONIN
1000 DAILY
Status: DISCONTINUED | OUTPATIENT
Start: 2018-01-25 | End: 2018-01-26 | Stop reason: HOSPADM

## 2018-01-25 RX ORDER — INSULIN LISPRO 100 [IU]/ML
INJECTION, SOLUTION INTRAVENOUS; SUBCUTANEOUS
Status: DISCONTINUED | OUTPATIENT
Start: 2018-01-25 | End: 2018-01-26 | Stop reason: HOSPADM

## 2018-01-25 RX ORDER — SODIUM CHLORIDE 0.9 % (FLUSH) 0.9 %
5-10 SYRINGE (ML) INJECTION AS NEEDED
Status: DISCONTINUED | OUTPATIENT
Start: 2018-01-25 | End: 2018-01-26 | Stop reason: HOSPADM

## 2018-01-25 RX ORDER — MAGNESIUM SULFATE 100 %
4 CRYSTALS MISCELLANEOUS AS NEEDED
Status: DISCONTINUED | OUTPATIENT
Start: 2018-01-25 | End: 2018-01-26 | Stop reason: HOSPADM

## 2018-01-25 RX ORDER — IPRATROPIUM BROMIDE AND ALBUTEROL SULFATE 2.5; .5 MG/3ML; MG/3ML
3 SOLUTION RESPIRATORY (INHALATION)
Status: DISCONTINUED | OUTPATIENT
Start: 2018-01-25 | End: 2018-01-26 | Stop reason: HOSPADM

## 2018-01-25 RX ORDER — HEPARIN SODIUM 5000 [USP'U]/ML
5000 INJECTION, SOLUTION INTRAVENOUS; SUBCUTANEOUS EVERY 8 HOURS
Status: DISCONTINUED | OUTPATIENT
Start: 2018-01-25 | End: 2018-01-26 | Stop reason: HOSPADM

## 2018-01-25 RX ORDER — HYDROCODONE BITARTRATE AND ACETAMINOPHEN 5; 325 MG/1; MG/1
1 TABLET ORAL
Status: DISCONTINUED | OUTPATIENT
Start: 2018-01-25 | End: 2018-01-26 | Stop reason: HOSPADM

## 2018-01-25 RX ORDER — POTASSIUM CHLORIDE 20 MEQ/1
40 TABLET, EXTENDED RELEASE ORAL EVERY 4 HOURS
Status: COMPLETED | OUTPATIENT
Start: 2018-01-25 | End: 2018-01-25

## 2018-01-25 RX ORDER — SIMVASTATIN 10 MG/1
5 TABLET, FILM COATED ORAL
Status: DISCONTINUED | OUTPATIENT
Start: 2018-01-25 | End: 2018-01-26 | Stop reason: HOSPADM

## 2018-01-25 RX ORDER — OSELTAMIVIR PHOSPHATE 30 MG/1
30 CAPSULE ORAL 2 TIMES DAILY
Status: DISCONTINUED | OUTPATIENT
Start: 2018-01-25 | End: 2018-01-26

## 2018-01-25 RX ORDER — ACETAMINOPHEN 325 MG/1
650 TABLET ORAL
Status: DISCONTINUED | OUTPATIENT
Start: 2018-01-25 | End: 2018-01-26 | Stop reason: HOSPADM

## 2018-01-25 RX ADMIN — Medication 10 ML: at 13:15

## 2018-01-25 RX ADMIN — VITAMIN D, TAB 1000IU (100/BT) 1000 UNITS: 25 TAB at 09:06

## 2018-01-25 RX ADMIN — IPRATROPIUM BROMIDE AND ALBUTEROL SULFATE 3 ML: 2.5; .5 SOLUTION RESPIRATORY (INHALATION) at 20:13

## 2018-01-25 RX ADMIN — INSULIN LISPRO 12 UNITS: 100 INJECTION, SOLUTION INTRAVENOUS; SUBCUTANEOUS at 08:57

## 2018-01-25 RX ADMIN — LOSARTAN POTASSIUM 25 MG: 25 TABLET ORAL at 09:06

## 2018-01-25 RX ADMIN — MAGNESIUM SULFATE HEPTAHYDRATE 2 G: 40 INJECTION, SOLUTION INTRAVENOUS at 10:25

## 2018-01-25 RX ADMIN — Medication 10 ML: at 13:14

## 2018-01-25 RX ADMIN — POTASSIUM CHLORIDE 40 MEQ: 20 TABLET, EXTENDED RELEASE ORAL at 13:05

## 2018-01-25 RX ADMIN — IPRATROPIUM BROMIDE AND ALBUTEROL SULFATE 3 ML: 2.5; .5 SOLUTION RESPIRATORY (INHALATION) at 14:50

## 2018-01-25 RX ADMIN — HEPARIN SODIUM 5000 UNITS: 5000 INJECTION, SOLUTION INTRAVENOUS; SUBCUTANEOUS at 09:02

## 2018-01-25 RX ADMIN — POTASSIUM CHLORIDE 40 MEQ: 20 TABLET, EXTENDED RELEASE ORAL at 10:18

## 2018-01-25 RX ADMIN — BUDESONIDE AND FORMOTEROL FUMARATE DIHYDRATE 2 PUFF: 80; 4.5 AEROSOL RESPIRATORY (INHALATION) at 11:52

## 2018-01-25 RX ADMIN — BUDESONIDE AND FORMOTEROL FUMARATE DIHYDRATE 2 PUFF: 80; 4.5 AEROSOL RESPIRATORY (INHALATION) at 20:00

## 2018-01-25 RX ADMIN — HEPARIN SODIUM 5000 UNITS: 5000 INJECTION, SOLUTION INTRAVENOUS; SUBCUTANEOUS at 17:59

## 2018-01-25 RX ADMIN — GLIPIZIDE 5 MG: 5 TABLET ORAL at 10:21

## 2018-01-25 RX ADMIN — OSELTAMIVIR PHOSPHATE 30 MG: 30 CAPSULE ORAL at 17:59

## 2018-01-25 RX ADMIN — OSELTAMIVIR PHOSPHATE 75 MG: 75 CAPSULE ORAL at 08:58

## 2018-01-25 RX ADMIN — SIMVASTATIN 5 MG: 10 TABLET, FILM COATED ORAL at 22:21

## 2018-01-25 NOTE — H&P
History and Physical    Hospitalist Admission History and Physical    NAME:  Staci Wilkinson   :   1940   MRN:   [id number]     PCP:  Teetee Claudio MD  Date/Time:  2018 12:56 AM  Subjective:   CHIEF COMPLAINT:      HISTORY OF PRESENT ILLNESS:     This is a 78 y.o.  female with a medical history significant for asthma, DM2, and HTN who presents with a <1 day history of vomiting. Per pt, she was having some issues with weakness and repeated falls with difficulty in getting into her bed and though there was something wrong. Per ED note, family states that the pt had been complaining about having a cold and cough for ~ 5 days prior to admission and that she was having multiple episodes of vomiting during the night. Pt however states that she only had one episode of vomiting and was not havig any issues with cough or SOB. She was given a breathing tx by EMT and per notes pt had some relief. In ED, pt had labs done and influenza A was positive. Past Medical History:   Diagnosis Date    Asthma     Diabetes ([address]     Hypertension         Past Surgical History:   Procedure Laterality Date    HX CHOLECYSTECTOMY         Social History   Substance Use Topics    Smoking status: Not on file    Smokeless tobacco: Not on file    Alcohol use Not on file        History reviewed. No pertinent family history. Allergies   Allergen Reactions    Aspirin Anaphylaxis        Prior to Admission Medications   Prescriptions Last Dose Informant Patient Reported? Taking? METFORMIN HCL (METFORMIN PO) 2018 at Unknown time  Yes Yes   Sig: Take  by mouth. OTHER   Yes Yes   Sig: Cholesterol med   cholecalciferol (VITAMIN D3) 1,000 unit cap 2018 at Unknown time  Yes Yes   Sig: Take  by mouth daily. losartan (COZAAR) 25 mg tablet 2018 at Unknown time  Yes Yes   Sig: Take 25 mg by mouth daily.  Indications: hypertension   simvastatin (ZOCOR) 5 mg tablet 2018 at Unknown time  Yes Yes   Sig: Take 5 mg by mouth nightly. Facility-Administered Medications: None       REVIEW OF SYSTEMS:     [] Unable to obtain  ROS due to  []mental status change  []sedated   []intubated   [x]Total of 10 systems reviewed as follows:  Constitutional:  negative fever, negative chills  Eyes:   negative visual changes  ENT:   negative sore throat,  Respiratory:  negative cough, negative dyspnea  Cards:   negative for chest pain, palpitations, lower extremity edema  GI:   + for nausea, vomiting,  Musculoskel: +for myalgias  and muscle weakness  Neurological:  negative for headaches, dizziness, vertigo + weakness  Behavl/Psych:  negative for feelings of anxiety, depression     Pertinent Positives include :    Objective:   VITALS:    Visit Vitals    /69 (BP 1 Location: Left arm, BP Patient Position: At rest)    Pulse 91    Temp 98.4 °F (36.9 °C)    Resp 20    Ht 4' 11\" (1.499 m)    Wt 70.8 kg (156 lb)    SpO2 98%    BMI 31.51 kg/m2     Temp (24hrs), Av.6 °F (38.1 °C), Min:98.4 °F (36.9 °C), Max:102.6 °F (39.2 °C)      PHYSICAL EXAM:   General:    Alert, cooperative, no distress     Head:   Normocephalic,atraumatic. Eyes:   Conjunctivae clear, anicteric sclerae. Lungs:   Clear to auscultation bilaterally. + expiratory wheezes in anterior lung fields  Heart:   Regular rate and rhythm,  no murmur appreciated  Abdomen:   Soft, non-tender. Not distended. Bowel sounds +   Extremities: Extremities  Trace edema. Skin:     No rashes or lesions. Not Jaundiced  Lymph nodes: (-) LAD  Psych:  Good insight. Not depressed. Not anxious or agitated. Neurologic: EOMs intact. Normal  strength, Alert and oriented X 3.        LAB DATA REVIEWED:    No components found for: [place]  Recent Labs      18   1715   NA  134*   K  3.6   CL  95*   CO2  26   BUN  13   CREA  0.99   GLU  202*   CA  9.4   WBC  9.2   HGB  13.3   HCT  40.2   PLT  134*         IMAGING RESULTS:   []  I have personally reviewed the actual   []CXR  []CT scan    CXR: 1/24/2018 FINDINGS:     Cardiac size is within normal limits. Pulmonary vascularity is minimally  prominent but not cephalized.     The study demonstrated accentuated. Markings, indicating moderate bronchitis,  with or without bronchial asthma. Lungs are not obviously hyperinflated. There  is no evidence of pleural effusion, pneumothorax or pneumomediastinum.     At the left lung base there are mild streaky asymmetric opacities, which may  represent mild atelectatic changes although subtle infiltrates cannot be  excluded. But there is no confluent pneumonia demonstrated.     In thoracic spine there are findings of mild multilevel spondylosis.     IMPRESSIONS:     Moderate bronchitis.     Mild streaky atelectasis or fibrotic changes in left lung base, with or without  subtle infiltrates.     No evidence of confluent pneumonia, confluent pulmonary atelectasis or pleural  effusion.     Normal cardiac size, without evidence of CHF.     Assessment/Plan:      Active Problems:    Bronchospasm (1/24/2018)      Hypoxia (1/24/2018)     Influenza A  ___________________________________________________  PLAN:  This is a 77 yo WF admitted for influenza A with SOB and wheezing m/l related to bronchoconstriction requiring supplemental O2.    -will continue O2 supplementation, currently 2L NC, and titrated to keep O2 sats > 95%  -will order DuoNebs q4 hours to help with symptoms  --will start TamiFlu 75 mg po bid since it is unclear when she became symptomatic and may help with resolution of symptoms  -will order prn pain and cough medication    Risk of deterioration:  []Low    [x]Moderate  []High              Prophylaxis:  []Lovenox  []Coumadin  []Hep SQ  [x]SCDs  []H2B/PPI    Disposition:  [x]Home w/ Family   [] PT,OT,RN   []SNF/LTC   []SAH/Rehab    Discussed Code Status:    [x]Full Code      []DNR     ___________________________________________________    Care Plan discussed with:    [x]Patient   []Family    []ED Care Manager  []ED Doc   []Specialist :    Total Time Coordinating Admission:   80   minutes    []Total Critical Care Time:     ___________________________________________________  Admitting Physician: Juanjo Gerber MD

## 2018-01-25 NOTE — PROGRESS NOTES
SUBJECTIVE:    Sitting up in bed and eating breakfast.  Nurse is at bedside. No chest or abdominal pain. Cough present. No more nausea or vomiting since she has been here. Denies for having SOB. Talked to patient's daughter [Ms. Etienne] @ [phone] and informed her about patient's current clinical condition and possible discharge home tomorrow     OBJECTIVE:    /77 (BP 1 Location: Left arm, BP Patient Position: Sitting)  Pulse 73  Temp 98.7 °F (37.1 °C)  Resp 20  Ht 4' 11\" (1.499 m)  Wt 70.3 kg (155 lb)  SpO2 97%  BMI 31.31 kg/m2    General: NAD, Awake  Neck: trachea is midline  CVS: RRR  RS: Rhonchi present  GI: NT, BS +  Extremities: no pedal edema  CNS: Awake, Follows commands    ASSESSMENT:    1. Nausea and vomiting could be viral gastroenteritis, improving currently  2. Flu A with viral bronchitis  3. Fever sec to # 3, better  4. Bronchospasm and h/o chronic mild intermittent asthma  5. DM with A1c of 7.2  6. Generalized weakness  7. Hypokalemia and hypomagnesemia  8.  Lactic acidosis      PLAN:    Cont Tamiflu  Reduce NS and check lactic acid  Replete K and Mag  Glipizide will be started  Diabetic education  PT/OT consulted    Possible discharge home tomorrow if remains stable overnight    CMP:   Lab Results   Component Value Date/Time     01/25/2018 03:10 AM    K 3.1 (L) 01/25/2018 03:10 AM     01/25/2018 03:10 AM    CO2 24 01/25/2018 03:10 AM    AGAP 9 01/25/2018 03:10 AM     (H) 01/25/2018 03:10 AM    BUN 16 01/25/2018 03:10 AM    CREA 0.90 01/25/2018 03:10 AM    GFRAA >60 01/25/2018 03:10 AM    GFRNA >60 01/25/2018 03:10 AM    CA 8.0 (L) 01/25/2018 03:10 AM    MG 1.6 01/25/2018 03:10 AM    PHOS 2.8 01/25/2018 03:10 AM    ALB 3.0 (L) 01/25/2018 03:10 AM    TP 6.7 01/25/2018 03:10 AM    GLOB 3.7 01/25/2018 03:10 AM    AGRAT 0.8 01/25/2018 03:10 AM    SGOT 21 01/25/2018 03:10 AM    ALT 26 01/25/2018 03:10 AM     CBC:   Lab Results   Component Value Date/Time    WBC 5.6 01/25/2018 03:10 AM    HGB 11.0 (L) 01/25/2018 03:10 AM    HCT 33.5 (L) 01/25/2018 03:10 AM     (L) 01/25/2018 03:10 AM

## 2018-01-25 NOTE — PROGRESS NOTES
Please contact the pt's granddaughter at [phone] before calling any other contacts. JONAH INITIAL NOTE:     JONAH met with the pt and her granddaughter to gather information this assessment. Pt's daughter can provide discharge transport if discharged tomorrow after 12:30. Pt reported being very independent and does not utilize any DME. Care Management Interventions  PCP Verified by CM:  [facility]  Mode of Transport at Discharge: BLS (Pt's granddaughter Mona Amato will provide discharge transport)  Physical Therapy Consult: Yes  Occupational Therapy Consult: Yes  Current Support Network: Other (Pt resides in a senior living community on the 1st floor)  Confirm Follow Up Transport: Family (Pt's granddaughter will provide discharge transportation. She can provide transport anytime after 12:30 1-26-18.)  Plan discussed with Pt/Family/Caregiver: Yes (Pt and granddaughter are aware of potential discharge tomorrow. )  [employer] Provided?: No (Pt is not a )  Discharge Location  Discharge Placement: [facility]      JONAH will be available to assist with discharge purposes.      ZANDER Romero

## 2018-01-25 NOTE — DIABETES MGMT
Diabetes Patient/Family Education Record    Factors That  May Influence Patients Ability  to Learn or  Comply with Recommendations   []   Language barrier    []   Cultural needs   []   Motivation    []   Cognitive limitation    []   Physical   []   Education    []   Physiological factors   []   Hearing/vision/speaking impairment   []   Anglican beliefs    []   Financial factors   []  Other:   [x]  No factors identified at this time.      Person Instructed:   [x]   Patient   []   Family   []  Other     Preference for Learning:   [x]   Verbal   [x]   Written   []  Demonstration     Level of Comprehension & Competence:    []  Good                                      [x] Fair                                     []  Poor                             []  Needs Reinforcement   [x]  Teachback completed    Education Component:   [x]  Medication management,   [x]  Nutritional management    []  Exercise   []  Signs, symptoms, and treatment of hyperglycemia and hypoglycemia   [] Prevention, recognition and treatment of hyperglycemia and hypoglycemia   [x]  Importance of blood glucose monitoring and how to obtain a blood glucose meter    []  Instruction on use of the blood glucose meter   [x]  Discuss the importance of HbA1C monitoring    []  Sick day guidelines   []  Proper use and disposal of lancets, needles, syringes or insulin pens (if appropriate)   [x]  Potential long-term complications (retinopathy, kidney disease, neuropathy, foot care)   [x] Information about whom to contact in case of emergency or for more information    [x]  Goal:  Patient/family will demonstrate understanding of Diabetes Self Management Skills by: (date) 2/1/18_______  Plan for post-discharge education or self-management support:    [x] Outpatient class schedule provided            [] Patient Declined    [] Scheduled for outpatient classes (date) _______     Heron Ryan MS, [address]  Pager: [phone]

## 2018-01-25 NOTE — PROGRESS NOTES
Problem: Mobility Impaired (Adult and Pediatric)  Goal: *Acute Goals and Plan of Care (Insert Text)  Physical Therapy Goals  Initiated 1/25/2018 and to be accomplished within 3-5 day(s)  1. Patient will move from supine to sit and sit to supine  and scoot up and down in bed with independence. 2.  Patient will transfer from bed to chair and chair to bed with modified independence using the least restrictive device. 3.  Patient will perform sit to stand with modified independence. 4.  Patient will ambulate with modified independence for 100 feet with the least restrictive device. physical Therapy EVALUATION    Patient: Tolu Durand (22 y.o. female)  Date: 1/25/2018  Primary Diagnosis: Hypoxia  Bronchospasm        Precautions:   Fall, Other (comment) (droplet isolation)    ASSESSMENT :  Based on the objective data described below, the patient presents with decreased strength B LE's, decreased activity tolerance, impaired standing balance, and impaired gait without AD. Pt PLOF was independent without AD. Pt presented today at SBA level for safety without AD. Pt O2 sats measured at 95% and 97% after activity without O2 donned (room air). Pt motivated and cooperative. Pt participated in seated B LE therex at EOB and encouraged to get out of bed to chair for meals. Patient will benefit from skilled intervention to address the above impairments.   Patients rehabilitation potential is considered to be Good  Factors which may influence rehabilitation potential include:   []         None noted  []         Mental ability/status  [x]         Medical condition  []         Home/family situation and support systems  []         Safety awareness  []         Pain tolerance/management  []         Other:      PLAN :  Recommendations and Planned Interventions:  [x]           Bed Mobility Training             [x]    Neuromuscular Re-Education  [x]           Transfer Training                   [] Orthotic/Prosthetic Training  [x]           Gait Training                          []    Modalities  [x]           Therapeutic Exercises          []    Edema Management/Control  [x]           Therapeutic Activities            [x]    Patient and Family Training/Education  []           Other (comment):    Frequency/Duration: Patient will be followed by physical therapy 1-2 times per day/4-7 days per week to address goals. Discharge Recommendations: Home Health  Further Equipment Recommendations for Discharge: N/A     G-CODES     Mobility  Current  CJ= 20-39%   Goal  CI= 1-19%. The severity rating is based on the Level of Assistance required for Functional Mobility and ADLs.        G-CODES     Eval Complexity: History: MEDIUM  Complexity : 1-2 comorbidities / personal factors will impact the outcome/ POC Exam:MEDIUM Complexity : 3 Standardized tests and measures addressing body structure, function, activity limitation and / or participation in recreation  Presentation: LOW Complexity : Stable, uncomplicated  Clinical Decision Making: Low complexity  Overall Complexity:LOW     SUBJECTIVE:   Patient stated I'm doing better today and feel steadier than I did when I came in. \"Yes, I've been walking to the bathroom with the nurses. \"  \"I hope I can get off this oxygen. \"    OBJECTIVE DATA SUMMARY:     Past Medical History:   Diagnosis Date    Asthma     Diabetes ([facility] [address]     Hypertension      Past Surgical History:   Procedure Laterality Date    HX CHOLECYSTECTOMY       Prior Level of Function/Home Situation: pt reports being independent with all mobility, used stairs at times without assistance, and used gym at her apt building regularly with her grand-daughter. No DME used prior.   Home Situation  Home Environment: Assisted living (multi-level building, 1st floor apt)  # Steps to Enter: 0  Wheelchair Ramp: Yes  One/Two Story Residence: One story  Living Alone: Yes  Support Systems: Family member(s)  Patient Expects to be Discharged to[de-identified] Assisted living  Current DME Used/Available at Home: None  Critical Behavior:  Neurologic State: Alert  Orientation Level: Oriented X4  Cognition: Appropriate decision making; Follows commands  Safety/Judgement: Awareness of environment  Psychosocial  Patient Behaviors: Calm; Cooperative  Family  Behaviors: Calm;Supportive     Family  Behaviors: Calm;Supportive           Strength:    Strength: Generally decreased, functional (B LE's grossly 4-/5)     Tone & Sensation:   Tone: Normal (B LE's)      Range Of Motion:  AROM: Within functional limits (B LE's)     Functional Mobility:  Bed Mobility:  Rolling: Independent  Supine to Sit: Modified independent  Sit to Supine: Modified independent  Scooting: Modified independent  Transfers:  Sit to Stand: Stand-by asssistance  Stand to Sit: Supervision  Bed to Chair: Stand-by asssistance (stand step w/out AD)        Balance:   Sitting: Intact  Standing: Impaired  Standing - Static: Good  Standing - Dynamic : Fair (+, without AD)    Ambulation/Gait Training:  Distance (ft): 45 Feet (ft) (15 ft x 3 laps in room w/out rest breaks)  Assistive Device:  (none)  Ambulation - Level of Assistance: Stand-by asssistance  Gait Description (WDL): Exceptions to WDL  Gait Abnormalities: Trunk sway increased  Base of Support: Widened  Speed/Renetta: Pace decreased (<100 feet/min)     Therapeutic Exercises:   Seated B LE AROM, at EOB unsupported, 10 x 1 set: FELI alvarado AP's  Pt educated on performing same exercises for 1 set of 10 reps again this evening for HEP    Pain:  Pt reports 0/10 pain or discomfort prior to treatment.    Pt reports 0/10 pain or discomfort post treatment. Activity Tolerance:   Good, pt without c/o's and O2 sats 95% and 97% after gait training and exercises on room air. Nursing notified of O2 status on room air w/activity. Please refer to the flowsheet for vital signs taken during this treatment.   After treatment:   []         Patient left in no apparent distress sitting up in chair  [x]         Patient left in no apparent distress in bed  [x]         Call bell left within reach  []         Nursing notified  [x]         Family member present  []         Bed alarm activated    COMMUNICATION/EDUCATION:   [x]         Fall prevention education was provided and the patient/caregiver indicated understanding. [x]         Patient/family have participated as able in goal setting and plan of care. [x]         Patient/family agree to work toward stated goals and plan of care. []         Patient understands intent and goals of therapy, but is neutral about his/her participation. []         Patient is unable to participate in goal setting and plan of care.     Thank you for this referral.  Brody Candelaria, PT   Time Calculation: 25 mins

## 2018-01-25 NOTE — ROUTINE PROCESS
TRANSFER - OUT REPORT:    Verbal report given to Trudi Duran RN on Gerber Ann  [facility] for routine progression of care       Report consisted of patients Situation, Background, Assessment and   Recommendations(SBAR). Information from the following report(s) SBAR, ED Summary, [facility] and Recent Results was reviewed with the receiving nurse. Lines:   Peripheral IV 01/24/18 Right Antecubital (Active)   Site Assessment Clean, dry, & intact 1/24/2018  5:20 PM   Phlebitis Assessment 0 1/24/2018  5:20 PM   Infiltration Assessment 0 1/24/2018  5:20 PM   Dressing Status Clean, dry, & intact 1/24/2018  5:20 PM   Dressing Type Tape;Transparent 1/24/2018  5:20 PM   Hub Color/Line Status Pink;Flushed;Patent 1/24/2018  5:20 PM   Action Taken Blood drawn 1/24/2018  5:20 PM        Opportunity for questions and clarification was provided.       Patient transported with:   O2 @ 2L NC liters

## 2018-01-25 NOTE — DIABETES MGMT
GLYCEMIC CONTROL PLAN OF CARE    Assessment:  Pt is 78 yr old female admitted on 1/24/18 for treatment and evaluation of vomiting for >1 day. Pt with past medical history significant for T2DM, Asthma, HTN, Body mass index is 31.31 kg/(m^2). -Obesity. Recommendations:  Diabetic education. Pt BG trending above target. Recommend advance to very insulin resistant scale lispro. Will continue to monitor inpatient for intervention. Most recent blood glucose values:  Lab Results   Component Value Date/Time     (H) 01/25/2018 03:10 AM     (H) 01/24/2018 05:15 PM    GLUCPOC 112 (H) 01/25/2018 12:12 PM    GLUCPOC 336 (H) 01/25/2018 08:56 AM     Current A1C:  7.2%-1/25/18 estimated average blood glucose of 160 mg/dl over the past 2-3 months    Current hospital diabetes medications:  Correctional lispro ACHS- normal insulin sensitivity scale  5 mg glyburide daily with breakfast    Diet:   Diabetic consistent carbohydrate, 2 gram sodium    Home diabetes medications:  Pt reports she was taken off of metformin but given some by her last MD just until she saw her new one?     Goals:  Pt BG will be within target range by _1/28/18____    Education:  _x__  Refer to Diabetes Education Record             ___  Education not indicated at this time    Jacob Conner [address], [address] [place] [address], [facility]  Pager: [phone]

## 2018-01-25 NOTE — ROUTINE PROCESS
Bedside shift change report given to Shelbie Dasilva RN (oncoming nurse) by Trudi Duran (offgoing nurse). Report included the following information SBAR, Kardex, Intake/Output, MAR, Recent Results and Med Rec Status.

## 2018-01-26 ENCOUNTER — HOME HEALTH ADMISSION (OUTPATIENT)
Dept: HOME HEALTH SERVICES | Facility: HOME HEALTH | Age: 78
End: 2018-01-26

## 2018-01-26 VITALS
BODY MASS INDEX: 31.19 KG/M2 | DIASTOLIC BLOOD PRESSURE: 70 MMHG | WEIGHT: 154.7 LBS | HEART RATE: 65 BPM | SYSTOLIC BLOOD PRESSURE: 152 MMHG | RESPIRATION RATE: 18 BRPM | HEIGHT: 59 IN | OXYGEN SATURATION: 99 % | TEMPERATURE: 97.3 F

## 2018-01-26 LAB
ANION GAP SERPL CALC-SCNC: 7 MMOL/L (ref 3–18)
BASOPHILS # BLD: 0 K/UL (ref 0–0.1)
BASOPHILS NFR BLD: 0 % (ref 0–2)
BUN SERPL-MCNC: 25 MG/DL (ref 7–18)
BUN/CREAT SERPL: 38 (ref 12–20)
CALCIUM SERPL-MCNC: 7.9 MG/DL (ref 8.5–10.1)
CHLORIDE SERPL-SCNC: 112 MMOL/L (ref 100–108)
CO2 SERPL-SCNC: 24 MMOL/L (ref 21–32)
CREAT SERPL-MCNC: 0.65 MG/DL (ref 0.6–1.3)
DIFFERENTIAL METHOD BLD: ABNORMAL
EOSINOPHIL # BLD: 0 K/UL (ref 0–0.4)
EOSINOPHIL NFR BLD: 0 % (ref 0–5)
ERYTHROCYTE [DISTWIDTH] IN BLOOD BY AUTOMATED COUNT: 13.6 % (ref 11.6–14.5)
GLUCOSE BLD STRIP.AUTO-MCNC: 107 MG/DL (ref 70–110)
GLUCOSE BLD STRIP.AUTO-MCNC: 143 MG/DL (ref 70–110)
GLUCOSE SERPL-MCNC: 119 MG/DL (ref 74–99)
HCT VFR BLD AUTO: 32.9 % (ref 35–45)
HGB BLD-MCNC: 10.8 G/DL (ref 12–16)
LACTATE SERPL-SCNC: 0.8 MMOL/L (ref 0.4–2)
LYMPHOCYTES # BLD: 1.5 K/UL (ref 0.9–3.6)
LYMPHOCYTES NFR BLD: 15 % (ref 21–52)
MAGNESIUM SERPL-MCNC: 2.5 MG/DL (ref 1.6–2.6)
MCH RBC QN AUTO: 27.1 PG (ref 24–34)
MCHC RBC AUTO-ENTMCNC: 32.8 G/DL (ref 31–37)
MCV RBC AUTO: 82.7 FL (ref 74–97)
MONOCYTES # BLD: 0.7 K/UL (ref 0.05–1.2)
MONOCYTES NFR BLD: 7 % (ref 3–10)
NEUTS SEG # BLD: 7.8 K/UL (ref 1.8–8)
NEUTS SEG NFR BLD: 78 % (ref 40–73)
PLATELET # BLD AUTO: 133 K/UL (ref 135–420)
PMV BLD AUTO: 13.4 FL (ref 9.2–11.8)
POTASSIUM SERPL-SCNC: 3.9 MMOL/L (ref 3.5–5.5)
RBC # BLD AUTO: 3.98 M/UL (ref 4.2–5.3)
SODIUM SERPL-SCNC: 143 MMOL/L (ref 136–145)
WBC # BLD AUTO: 10 K/UL (ref 4.6–13.2)

## 2018-01-26 PROCEDURE — 97116 GAIT TRAINING THERAPY: CPT

## 2018-01-26 PROCEDURE — 74011000250 HC RX REV CODE- 250: Performed by: INTERNAL MEDICINE

## 2018-01-26 PROCEDURE — 85025 COMPLETE CBC W/AUTO DIFF WBC: CPT | Performed by: INTERNAL MEDICINE

## 2018-01-26 PROCEDURE — 97165 OT EVAL LOW COMPLEX 30 MIN: CPT

## 2018-01-26 PROCEDURE — 94640 AIRWAY INHALATION TREATMENT: CPT

## 2018-01-26 PROCEDURE — 36415 COLL VENOUS BLD VENIPUNCTURE: CPT | Performed by: INTERNAL MEDICINE

## 2018-01-26 PROCEDURE — 74011250637 HC RX REV CODE- 250/637: Performed by: INTERNAL MEDICINE

## 2018-01-26 PROCEDURE — 83605 ASSAY OF LACTIC ACID: CPT | Performed by: INTERNAL MEDICINE

## 2018-01-26 PROCEDURE — 83735 ASSAY OF MAGNESIUM: CPT | Performed by: INTERNAL MEDICINE

## 2018-01-26 PROCEDURE — 82962 GLUCOSE BLOOD TEST: CPT

## 2018-01-26 PROCEDURE — 74011250636 HC RX REV CODE- 250/636: Performed by: INTERNAL MEDICINE

## 2018-01-26 PROCEDURE — 80048 BASIC METABOLIC PNL TOTAL CA: CPT | Performed by: INTERNAL MEDICINE

## 2018-01-26 RX ORDER — ACETAMINOPHEN 500 MG
500 TABLET ORAL
Qty: 20 TAB | Refills: 0 | Status: SHIPPED
Start: 2018-01-26 | End: 2021-11-14

## 2018-01-26 RX ORDER — ALBUTEROL SULFATE 90 UG/1
AEROSOL, METERED RESPIRATORY (INHALATION)
Qty: 1 INHALER | Refills: 0 | Status: SHIPPED | OUTPATIENT
Start: 2018-01-26 | End: 2021-11-14

## 2018-01-26 RX ORDER — BUDESONIDE AND FORMOTEROL FUMARATE DIHYDRATE 80; 4.5 UG/1; UG/1
2 AEROSOL RESPIRATORY (INHALATION) 2 TIMES DAILY
Qty: 1 INHALER | Refills: 0 | Status: SHIPPED | OUTPATIENT
Start: 2018-01-26 | End: 2021-11-14

## 2018-01-26 RX ORDER — OSELTAMIVIR PHOSPHATE 75 MG/1
75 CAPSULE ORAL 2 TIMES DAILY
Qty: 6 CAP | Refills: 0 | Status: SHIPPED | OUTPATIENT
Start: 2018-01-26 | End: 2018-01-29

## 2018-01-26 RX ORDER — BENZONATATE 100 MG/1
100 CAPSULE ORAL
Qty: 20 CAP | Refills: 0 | Status: SHIPPED | OUTPATIENT
Start: 2018-01-26 | End: 2018-02-13 | Stop reason: ALTCHOICE

## 2018-01-26 RX ORDER — ONDANSETRON 4 MG/1
4 TABLET, ORALLY DISINTEGRATING ORAL
Qty: 12 TAB | Refills: 0 | Status: SHIPPED | OUTPATIENT
Start: 2018-01-26 | End: 2021-11-14

## 2018-01-26 RX ORDER — OSELTAMIVIR PHOSPHATE 75 MG/1
75 CAPSULE ORAL 2 TIMES DAILY
Status: DISCONTINUED | OUTPATIENT
Start: 2018-01-26 | End: 2018-01-26 | Stop reason: HOSPADM

## 2018-01-26 RX ADMIN — HEPARIN SODIUM 5000 UNITS: 5000 INJECTION, SOLUTION INTRAVENOUS; SUBCUTANEOUS at 03:36

## 2018-01-26 RX ADMIN — IPRATROPIUM BROMIDE AND ALBUTEROL SULFATE 3 ML: 2.5; .5 SOLUTION RESPIRATORY (INHALATION) at 08:08

## 2018-01-26 RX ADMIN — VITAMIN D, TAB 1000IU (100/BT) 1000 UNITS: 25 TAB at 11:31

## 2018-01-26 RX ADMIN — IPRATROPIUM BROMIDE AND ALBUTEROL SULFATE 3 ML: 2.5; .5 SOLUTION RESPIRATORY (INHALATION) at 02:49

## 2018-01-26 RX ADMIN — Medication 10 ML: at 14:00

## 2018-01-26 RX ADMIN — BUDESONIDE AND FORMOTEROL FUMARATE DIHYDRATE 2 PUFF: 80; 4.5 AEROSOL RESPIRATORY (INHALATION) at 11:32

## 2018-01-26 RX ADMIN — IPRATROPIUM BROMIDE AND ALBUTEROL SULFATE 3 ML: 2.5; .5 SOLUTION RESPIRATORY (INHALATION) at 13:46

## 2018-01-26 RX ADMIN — OSELTAMIVIR PHOSPHATE 30 MG: 30 CAPSULE ORAL at 11:31

## 2018-01-26 RX ADMIN — LOSARTAN POTASSIUM 25 MG: 25 TABLET ORAL at 11:31

## 2018-01-26 RX ADMIN — GLIPIZIDE 5 MG: 5 TABLET ORAL at 11:31

## 2018-01-26 NOTE — DISCHARGE INSTRUCTIONS
Influenza (Flu): Care Instructions  Your Care Instructions    Influenza (flu) is an infection in the lungs and breathing passages. It is caused by the influenza virus. There are different strains, or types, of the flu virus from year to year. Unlike the common cold, the flu comes on suddenly and the symptoms, such as a cough, congestion, fever, chills, fatigue, aches, and pains, are more severe. These symptoms may last up to 10 days. Although the flu can make you feel very sick, it usually doesn't cause serious health problems. Home treatment is usually all you need for flu symptoms. But your doctor may prescribe antiviral medicine to prevent other health problems, such as pneumonia, from developing. Older people and those who have a long-term health condition, such as lung disease, are most at risk for having pneumonia or other health problems. Follow-up care is a key part of your treatment and safety. Be sure to make and go to all appointments, and call your doctor if you are having problems. It's also a good idea to know your test results and keep a list of the medicines you take. How can you care for yourself at home? · Get plenty of rest.  · Drink plenty of fluids, enough so that your urine is light yellow or clear like water. If you have kidney, heart, or liver disease and have to limit fluids, talk with your doctor before you increase the amount of fluids you drink. · Take an over-the-counter pain medicine if needed, such as acetaminophen (Tylenol), ibuprofen (Advil, Motrin), or naproxen (Aleve), to relieve fever, headache, and muscle aches. Read and follow all instructions on the label. No one younger than 20 should take aspirin. It has been linked to Reye syndrome, a serious illness. · Do not smoke. Smoking can make the flu worse. If you need help quitting, talk to your doctor about stop-smoking programs and medicines. These can increase your chances of quitting for good.   · Breathe moist air from a hot shower or from a sink filled with hot water to help clear a stuffy nose. · Before you use cough and cold medicines, check the label. These medicines may not be safe for young children or for people with certain health problems. · If the skin around your nose and lips becomes sore, put some petroleum jelly on the area. · To ease coughing:  ¨ Drink fluids to soothe a scratchy throat. ¨ Suck on cough drops or plain hard candy. ¨ Take an over-the-counter cough medicine that contains dextromethorphan to help you get some sleep. Read and follow all instructions on the label. ¨ Raise your head at night with an extra pillow. This may help you rest if coughing keeps you awake. · Take any prescribed medicine exactly as directed. Call your doctor if you think you are having a problem with your medicine. To avoid spreading the flu  · Wash your hands regularly, and keep your hands away from your face. · Stay home from school, work, and other public places until you are feeling better and your fever has been gone for at least 24 hours. The fever needs to have gone away on its own without the help of medicine. · Ask people living with you to talk to their doctors about preventing the flu. They may get antiviral medicine to keep from getting the flu from you. · To prevent the flu in the future, get a flu vaccine every fall. Encourage people living with you to get the vaccine. · Cover your mouth when you cough or sneeze. When should you call for help? Call 911 anytime you think you may need emergency care. For example, call if:  ? · You have severe trouble breathing. ?Call your doctor now or seek immediate medical care if:  ? · You have new or worse trouble breathing. ? · You seem to be getting much sicker. ? · You feel very sleepy or confused. ? · You have a new or higher fever. ? · You get a new rash. ? Watch closely for changes in your health, and be sure to contact your doctor if:  ? · You begin to get better and then get worse. ? · You are not getting better after 1 week. Where can you learn more? Go to http://jacqueline-sharon.info/. Enter P137 in the search box to learn more about \"Influenza (Flu): Care Instructions. \"  Current as of: May 12, 2017  Content Version: 11.4  © 3013-9910 [employer]. Care instructions adapted under license by [employer] (which disclaims liability or warranty for this information). If you have questions about a medical condition or this instruction, always ask your healthcare professional. [place] [address] any warranty or liability for your use of this information. DISCHARGE SUMMARY from Nurse    PATIENT INSTRUCTIONS:    After general anesthesia or intravenous sedation, for 24 hours or while taking prescription Narcotics:  · Limit your activities  · Do not drive and operate hazardous machinery  · Do not make important personal or business decisions  · Do  not drink alcoholic beverages  · If you have not urinated within 8 hours after discharge, please contact your surgeon on call. Report the following to your surgeon:  · Excessive pain, swelling, redness or odor of or around the surgical area  · Temperature over 100.5  · Nausea and vomiting lasting longer than 4 hours or if unable to take medications  · Any signs of decreased circulation or nerve impairment to extremity: change in color, persistent  numbness, tingling, coldness or increase pain  · Any questions    What to do at Home:  Recommended activity: Activity as tolerated. If you experience any of the following symptoms chest pain, chest tightness, shortness of breathe, fever, malaise, please follow up with primary care provider or emergency department. *  Please give a list of your current medications to your Primary Care Provider.     *  Please update this list whenever your medications are discontinued, doses are      changed, or new medications (including over-the-counter products) are added. *  Please carry medication information at all times in case of emergency situations. These are general instructions for a healthy lifestyle:    No smoking/ No tobacco products/ Avoid exposure to second hand smoke  Surgeon General's Warning:  Quitting smoking now greatly reduces serious risk to your health. Obesity, smoking, and sedentary lifestyle greatly increases your risk for illness    A healthy diet, regular physical exercise & weight monitoring are important for maintaining a healthy lifestyle    You may be retaining fluid if you have a history of heart failure or if you experience any of the following symptoms:  Weight gain of 3 pounds or more overnight or 5 pounds in a week, increased swelling in our hands or feet or shortness of breath while lying flat in bed. Please call your doctor as soon as you notice any of these symptoms; do not wait until your next office visit. Recognize signs and symptoms of STROKE:    F-face looks uneven    A-arms unable to move or move unevenly    S-speech slurred or non-existent    T-time-call 911 as soon as signs and symptoms begin-DO NOT go       Back to bed or wait to see if you get better-TIME IS BRAIN. Warning Signs of HEART ATTACK     Call 911 if you have these symptoms:   Chest discomfort. Most heart attacks involve discomfort in the center of the chest that lasts more than a few minutes, or that goes away and comes back. It can feel like uncomfortable pressure, squeezing, fullness, or pain.  Discomfort in other areas of the upper body. Symptoms can include pain or discomfort in one or both arms, the back, neck, jaw, or stomach.  Shortness of breath with or without chest discomfort.  Other signs may include breaking out in a cold sweat, nausea, or lightheadedness. Don't wait more than five minutes to call 911 - MINUTES MATTER! Fast action can save your life.  Calling 911 is almost always the fastest way to get lifesaving treatment. Emergency Medical Services staff can begin treatment when they arrive -- up to an hour sooner than if someone gets to the hospital by car. The discharge information has been reviewed with the patient. The patient verbalized understanding. Discharge medications reviewed with the patient and appropriate educational materials and side effects teaching were provided. Patient armband removed and shredded.   ___________________________________________________________________________________________________________________________________

## 2018-01-26 NOTE — PROGRESS NOTES
Discharge education completed. Patient peripheral IV removed with tip intact. Education including appointments, prescriptions, care after discharge and what to look out for explained with time for questions.

## 2018-01-26 NOTE — ROUTINE PROCESS
Bedside and Verbal shift change report given to Ita Rowland RN (oncoming nurse) by Jordon Kebede RN   (offgoing nurse). Report included the following information SBAR, Kardex, Intake/Output and MAR.

## 2018-01-26 NOTE — PROGRESS NOTES
conducted an initial consultation and Spiritual Assessment for Paloma Henderson, who is a 78 y.o.,female. Patients Primary Language is: [place]. According to the patients EMR Lutheran Affiliation is: Jewish. The reason the Patient came to the hospital is:   Patient Active Problem List    Diagnosis Date Noted    Influenza A (H5N1) 01/25/2018    Bronchospasm 01/24/2018    Hypoxia 01/24/2018        The  provided the following Interventions:  Initiated a relationship of care and support. Explored issues of odell, belief, spirituality and Jehovah's witness/ritual needs while hospitalized. Provided information about Spiritual Care Services. Chart reviewed. The following outcomes where achieved:   confirmed Patient's Lutheran Affiliation. Patient expressed gratitude for 's visit. Our Eucharist volunteer provided communion. Assessment:  Patient does not have any Jehovah's witness/cultural needs that will affect patients preferences in health care. There are no spiritual or Jehovah's witness issues which require intervention at this time. Plan:  Chaplains will continue to follow and will provide pastoral care on an as needed/requested basis.  recommends bedside caregivers page  on duty if patient shows signs of acute spiritual or emotional distress.     [address]  [phone])

## 2018-01-26 NOTE — DISCHARGE SUMMARY
PATIENT DISCHARGE INSTRUCTIONS      PATIENT DISCHARGE INSTRUCTIONS    Pippa Obrien / [id number] : 1940    Admitted 2018 Discharged: 2018     Dictated # [id number]    · It is important that you take the medication exactly as they are prescribed. · Keep your medication in the bottles provided by the pharmacist and keep a list of the medication names, dosages, and times to be taken in your wallet. · Do not take other medications without consulting your doctor. What to do at Home    Recommended Diet: Cardiac Diet and Diabetic Diet    Recommended Activity: PT/OT per Home Health    Current Discharge Medication List      START taking these medications    Details   acetaminophen (TYLENOL) 500 mg tablet Take 1 Tab by mouth every four (4) hours as needed. Indications: Fever, Pain  Qty: 20 Tab, Refills: 0      benzonatate (TESSALON) 100 mg capsule Take 1 Cap by mouth three (3) times daily as needed for Cough. Qty: 20 Cap, Refills: 0      budesonide-formoterol (SYMBICORT) 80-4.5 mcg/actuation HFAA Take 2 Puffs by inhalation two (2) times a day. Qty: 1 Inhaler, Refills: 0      oseltamivir (TAMIFLU) 75 mg capsule Take 1 Cap by mouth two (2) times a day for 3 days. Indications: INFLUENZA  Qty: 6 Cap, Refills: 0      ondansetron (ZOFRAN ODT) 4 mg disintegrating tablet Take 1 Tab by mouth every eight (8) hours as needed for Nausea. Qty: 12 Tab, Refills: 0      albuterol (PROVENTIL HFA, VENTOLIN HFA, PROAIR HFA) 90 mcg/actuation inhaler 2 PUFFS EVERY 4 HOURS while awake for 3 days then every 4 hours as needed for shortness of breath and/or wheezing  Qty: 1 Inhaler, Refills: 0         CONTINUE these medications which have NOT CHANGED    Details   cholecalciferol (VITAMIN D3) 1,000 unit cap Take  by mouth daily. METFORMIN HCL (METFORMIN PO) Take  by mouth. OTHER Cholesterol med      losartan (COZAAR) 25 mg tablet Take 25 mg by mouth daily.  Indications: hypertension      simvastatin (ZOCOR) 5 mg tablet Take 5 mg by mouth nightly.                Signed By: Gorge Monroe MD     January 26, 2018

## 2018-01-26 NOTE — PROGRESS NOTES
SW DISCHARGE NOTE: Pt is being discharged to home with [facility]. Pt has selected [address] [address] as her provider. FOC is in the chart. Pt requested SW to assist with completion of her [address]. SW reviewed all the information with the pt, witnessed signatures and provided a copy for the pt chart. SW will be available for discharge purposes if needed.     Quiana Linda, MSW LSW

## 2018-01-26 NOTE — PROGRESS NOTES
Problem: Mobility Impaired (Adult and Pediatric)  Goal: *Acute Goals and Plan of Care (Insert Text)  Physical Therapy Goals  Initiated 1/25/2018 and to be accomplished within 3-5 day(s)  1. Patient will move from supine to sit and sit to supine  and scoot up and down in bed with independence. 2.  Patient will transfer from bed to chair and chair to bed with modified independence using the least restrictive device. 3.  Patient will perform sit to stand with modified independence. 4.  Patient will ambulate with modified independence for 100 feet with the least restrictive device. physical Therapy TREATMENT    Patient: Tolu Durand (11 y.o. female)  Date: 1/26/2018  Diagnosis: Hypoxia  Bronchospasm <principal problem not specified>       Precautions: Fall, Other (comment) (droplet isolation)   Chart, physical therapy assessment, plan of care and goals were reviewed. ASSESSMENT:  Pt found supine in bed with HOB elevated. Pt able to perform all mobility independently including bed mobility, transfers and amb. Pt required no VC and had no instability at all. Pt limited amb due to room precautions but pt is very capable of amb. Pt is approved for D/C without AD by PT to home health to receive further skilled PT, pending medical status approval. Pt will remain on case load until D/C. Progression toward goals:  [x]      Improving appropriately and progressing toward goals  []      Improving slowly and progressing toward goals  []      Not making progress toward goals and plan of care will be adjusted     PLAN:  Patient continues to benefit from skilled intervention to address the above impairments. Continue treatment per established plan of care. Discharge Recommendations:  Home Health  Further Equipment Recommendations for Discharge:  N/A     SUBJECTIVE:   Patient stated Oh I'm fine.     OBJECTIVE DATA SUMMARY:   Critical Behavior:  Neurologic State: Alert, Eyes open spontaneously  Orientation Level: Oriented X4  Cognition: Appropriate decision making, Appropriate for age attention/concentration, Appropriate safety awareness, Follows commands  Safety/Judgement: Awareness of environment  Functional Mobility Training:  Bed Mobility:  Rolling: Independent  Supine to Sit: Independent  Sit to Supine: Independent  Scooting: Independent  Transfers:  Sit to Stand: Independent  Stand to Sit: Independent  Balance:  Sitting: Intact  Standing: Intact  Standing - Static: Good  Standing - Dynamic : Good  Ambulation/Gait Training:  Distance (ft): 30 Feet (ft)  Ambulation - Level of Assistance: Independent  Gait Description (WDL): Exceptions to [facility]  Base of Support: Widened  Pain:  Pain Scale 1: Numeric (0 - 10)  Pain Intensity 1: 0   Activity Tolerance:   good  Please refer to the flowsheet for vital signs taken during this treatment. After treatment:   [x] Patient left in no apparent distress sitting up in chair  [] Patient left in no apparent distress in bed  [x] Call bell left within reach  [x] Nursing notified  [] Caregiver present  [] Bed alarm activated      Bhatt Knox   Time Calculation: 9 mins    Mobility  Current  CH= 0%   Goal  CH= 0%  D/C  CH= 0%. The severity rating is based on the Level of Assistance required for Functional Mobility and ADLs.

## 2018-01-26 NOTE — ROUTINE PROCESS
Bedside and Verbal shift change report given to Ailyn Poole (oncoming nurse) by Lakeisha Cardoza RN (offgoing nurse). Report included the following information SBAR, Kardex, MAR and Recent Results. SITUATION:    Code Status: Full Code   Reason for Admission: [address]  day: 1   Problem List:       Hospital Problems  Never Reviewed          Codes Class Noted POA    Influenza A (H5N1) ICD-10-CM: Reed Falling. X2  ICD-9-CM: 488.02  1/25/2018 Unknown        Bronchospasm ICD-10-CM: J98.01  ICD-9-CM: 519.11  1/24/2018 Unknown        Hypoxia ICD-10-CM: R09.02  ICD-9-CM: 799.02  1/24/2018 Unknown              BACKGROUND:    Past Medical History:   Past Medical History:   Diagnosis Date    Asthma     Diabetes ([facility] [address]     Hypertension          Patient taking anticoagulants yes     ASSESSMENT:    Changes in Assessment Throughout Shift: none     Patient has Central Line: no Reasons if yes:    Patient has Corral Cath: no Reasons if yes:       Last Vitals:     Vitals:    01/25/18 1050 01/25/18 1500 01/25/18 1631 01/25/18 1931   BP: 136/62  127/67 123/78   Pulse: 70  75 68   Resp: 18  18 20   Temp: 99.1 °F (37.3 °C)  98.5 °F (36.9 °C) 99.3 °F (37.4 °C)   SpO2: 99% 95% 95% 97%   Weight:       Height:            IV and DRAINS (will only show if present)   Peripheral IV 01/24/18 Right Antecubital-Site Assessment: Clean, dry, & intact     WOUND (if present)   Wound Type:  none   Dressing present Dressing Present : No   Wound Concerns/Notes:  none     PAIN    Pain Assessment    Pain Intensity 1: 0 (01/25/18 1631)              Patient Stated Pain Goal: 0  o Interventions for Pain:  none  o Intervention effective: no pain  o Time of last intervention:    o Reassessment Completed: no      Last 3 Weights:  Last 3 Recorded Weights in this Encounter    01/24/18 1649 01/25/18 0402   Weight: 70.8 kg (156 lb) 70.3 kg (155 lb)     Weight change:      INTAKE/OUPUT    Current Shift:      Last three shifts: 01/24 0701 - 01/25 1900  In: 2890 [P.O.:840; I.V.:2050]  Out: 200 [Urine:200]     LAB RESULTS     Recent Labs      01/25/18   0310 01/24/18   1715   WBC  5.6  9.2   HGB  11.0*  13.3   HCT  33.5*  40.2   PLT  129*  134*        Recent Labs      01/25/18   0310  01/24/18   1715   NA  136  134*   K  3.1*  3.6   GLU  383*  202*   BUN  16  13   CREA  0.90  0.99   CA  8.0*  9.4   MG  1.6   --        RECOMMENDATIONS AND DISCHARGE PLANNING     1. Pending tests/procedures/ Plan of Care or Other Needs:      2. Discharge plan for patient and Needs/Barriers: \    3. Estimated Discharge Date: tomorrow Posted on Whiteboard in [facility]: yes      4. The patient's care plan was reviewed with the oncoming nurse. \"HEALS\" SAFETY CHECK      Fall Risk    Total Score: 3    Safety Measures: Safety Measures: Bed/Chair alarm on, Bed/Chair-Wheels locked, Bed in low position, Call light within reach, Gripper socks, Side rails X 3    A safety check occurred in the patient's room between off going nurse and oncoming nurse listed above. The safety check included the below items  Area Items   H  High Alert Medications - Verify all high alert medication drips (heparin, PCA, etc.)   E  Equipment - Suction is set up for ALL patients (with nati)  - Red plugs utilized for all equipment (IV pumps, etc.)  - WOWs wiped down at end of shift.  - Room stocked with oxygen, suction, and other unit-specific supplies   A  Alarms - Bed alarm is set for fall risk patients  - Ensure chair alarm is in place and activated if patient is up in a chair   L  Lines - Check IV for any infiltration  - Corral bag is empty if patient has a Corral   - Tubing and IV bags are labeled   S  Safety   - Room is clean, patient is clean, and equipment is clean. - Hallways are clear from equipment besides carts.    - Fall bracelet on for fall risk patients  - Ensure room is clear and free of clutter  - Suction is set up for ALL patients (with nati)  - Hallways are clear from equipment besides carts.    - Isolation precautions followed, supplies available outside room, sign posted     Greg Avendano RN

## 2018-01-26 NOTE — PROGRESS NOTES
Problem: Self Care Deficits Care Plan (Adult)  Goal: *Acute Goals and Plan of Care (Insert Text)  Outcome: Resolved/Met Date Met: 01/26/18  Occupational Therapy EVALUATION/discharge    Patient: Addie Marsh (71 y.o. female)  Date: 1/26/2018  Primary Diagnosis: Hypoxia  Bronchospasm        Precautions:   Fall (droplet)    ASSESSMENT AND RECOMMENDATIONS:  Based on the objective data described below, the patient is independent with functional mobility and self care tasks without an AD. Patient has WFL AROM and  strength of BUEs. Patient is able to alonzo/doff socks on EOB independently. Patient was independent with simulated toilet transfer. Skilled acute care occupational therapy is not indicated at this time. Discharge Recommendations: None  Further Equipment Recommendations for Discharge: N/A      Barriers to Learning/Limitations: none    COMPLEXITY     Eval Complexity: History: LOW Complexity : Brief history review ; Examination: LOW Complexity : 1-3 performance deficits relating to physical, cognitive , or psychosocial skils that result in activity limitations and / or participation restrictions ; Decision Making:LOW Complexity : No comorbidities that affect functional and no verbal or physical assistance needed to complete eval tasks  Assessment: Low Complexity      G-CODES:     Self Care  Current  CI= 1-19%   Goal  CI= 1-19%   D/C  CI= 1-19%. The severity rating is based on the Level of Assistance required for Functional Mobility and ADLs. SUBJECTIVE:   Patient stated Clara Tineo I'll sit up in the chair for lunch.     OBJECTIVE DATA SUMMARY:     Past Medical History:   Diagnosis Date    Asthma     Diabetes ([facility] [address]     Hypertension      Past Surgical History:   Procedure Laterality Date    HX CHOLECYSTECTOMY       Prior Level of Function/Home Situation: Patient reported she was independent in basic self care tasks and functional mobility PTA.   Home Situation  Home Environment: Assisted living (multi-level building, 1st floor apt)  # Steps to Enter: 0  Wheelchair Ramp: Yes  One/Two Story Residence: One story  Living Alone: Yes  Support Systems: Family member(s)  Patient Expects to be Discharged to[de-identified] Assisted living  Current DME Used/Available at Home: None  [x]     Right hand dominant   []     Left hand dominant  Cognitive/Behavioral Status:  Neurologic State: Alert  Orientation Level: Oriented X4  Cognition: Follows commands    Skin: No skin changes noted    Edema: No edema noted    Vision/Perceptual:       Acuity: Within Defined Limits      Coordination:  Coordination: Within functional limits (BUEs)       Balance:  Sitting: Intact  Standing: Intact; Without support  Standing - Static: Good  Standing - Dynamic : Good    Strength:  Strength: Within functional limits (BUEs)     Tone & Sensation:  Tone: Normal (BUEs)  Sensation: Intact (BUEs)     Range of Motion:  AROM: Within functional limits (BUEs)     Functional Mobility and Transfers for ADLs:  Bed Mobility:  Rolling: Independent  Supine to Sit: Independent  Sit to Supine: Independent  Scooting: Independent  Transfers:  Sit to Stand: Independent    Toilet Transfer : Independent (simulated)      ADL Assessment:(clinical judgement)  Feeding: Independent    Oral Facial Hygiene/Grooming: Independent    Bathing: Independent    Upper Body Dressing: Independent    Lower Body Dressing: Independent    Toileting: Independent     Pain:  Pt reports 0/10 pain or discomfort prior to treatment.    Pt reports 0/10 pain or discomfort post treatment. Activity Tolerance:   Good    Please refer to the flowsheet for vital signs taken during this treatment.   After treatment:   [x]  Patient left in no apparent distress sitting up in chair  []  Patient left in no apparent distress in bed  [x]  Call bell left within reach  [x]  Nursing notified  []  Caregiver present  []  Bed alarm activated    COMMUNICATION/EDUCATION:   Communication/Collaboration:  []      Home safety education was provided and the patient/caregiver indicated understanding. [x]      Patient/family have participated as able and agree with findings and recommendations. []      Patient is unable to participate in plan of care at this time.     Anthony Mendez, OTR/L  Time Calculation: 9 mins

## 2018-01-26 NOTE — PROGRESS NOTES
SUBJECTIVE:    Sitting up in bed. Walking in room per nursing. Tolerating diet. No chest or abdominal pain. Cough better. No more nausea or vomiting. Denies for SOB. Had a BM. OBJECTIVE:    /70 (BP 1 Location: Left arm, BP Patient Position: At rest)  Pulse 65  Temp 97.3 °F (36.3 °C)  Resp 18  Ht 4' 11\" (1.499 m)  Wt 70.2 kg (154 lb 11.2 oz)  SpO2 99%  BMI 31.25 kg/m2    General: NAD, Awake  CVS: RRR  RS: CTA bilaterally, no wheezes  GI: NT, BS +  Extremities: no pedal edema  CNS: Awake, Follows commands    ASSESSMENT:    1. Nausea and vomiting could be viral gastroenteritis, improved currently  2. Flu A with viral bronchitis, clinically better  3. Fever sec to # 3, resolved  4. Bronchospasm and h/o chronic mild intermittent asthma, much better today  5. DM with A1c of 7.2  6. Generalized weakness, better  7. Hypokalemia and hypomagnesemia  8. Lactic acidosis, resolved       PLAN:    Cont Tamiflu for 3 days   Discharge patient home today       CMP:   Lab Results   Component Value Date/Time     01/26/2018 04:21 AM    K 3.9 01/26/2018 04:21 AM     (H) 01/26/2018 04:21 AM    CO2 24 01/26/2018 04:21 AM    AGAP 7 01/26/2018 04:21 AM     (H) 01/26/2018 04:21 AM    BUN 25 (H) 01/26/2018 04:21 AM    CREA 0.65 01/26/2018 04:21 AM    GFRAA >60 01/26/2018 04:21 AM    GFRNA >60 01/26/2018 04:21 AM    CA 7.9 (L) 01/26/2018 04:21 AM    MG 2.5 01/26/2018 04:21 AM     CBC:   Lab Results   Component Value Date/Time    WBC 10.0 01/26/2018 04:21 AM    HGB 10.8 (L) 01/26/2018 04:21 AM    HCT 32.9 (L) 01/26/2018 04:21 AM     (L) 01/26/2018 04:21 AM     Current Discharge Medication List      START taking these medications    Details   acetaminophen (TYLENOL) 500 mg tablet Take 1 Tab by mouth every four (4) hours as needed. Indications: Fever, Pain  Qty: 20 Tab, Refills: 0      benzonatate (TESSALON) 100 mg capsule Take 1 Cap by mouth three (3) times daily as needed for Cough.   Qty: 20 Cap, Refills: 0      budesonide-formoterol (SYMBICORT) 80-4.5 mcg/actuation HFAA Take 2 Puffs by inhalation two (2) times a day. Qty: 1 Inhaler, Refills: 0      oseltamivir (TAMIFLU) 75 mg capsule Take 1 Cap by mouth two (2) times a day for 3 days. Indications: INFLUENZA  Qty: 6 Cap, Refills: 0      ondansetron (ZOFRAN ODT) 4 mg disintegrating tablet Take 1 Tab by mouth every eight (8) hours as needed for Nausea. Qty: 12 Tab, Refills: 0      albuterol (PROVENTIL HFA, VENTOLIN HFA, PROAIR HFA) 90 mcg/actuation inhaler 2 PUFFS EVERY 4 HOURS while awake for 3 days then every 4 hours as needed for shortness of breath and/or wheezing  Qty: 1 Inhaler, Refills: 0         CONTINUE these medications which have NOT CHANGED    Details   cholecalciferol (VITAMIN D3) 1,000 unit cap Take  by mouth daily. METFORMIN HCL (METFORMIN PO) Take  by mouth. OTHER Cholesterol med      losartan (COZAAR) 25 mg tablet Take 25 mg by mouth daily. Indications: hypertension      simvastatin (ZOCOR) 5 mg tablet Take 5 mg by mouth nightly.

## 2018-01-27 NOTE — HOME CARE
[facility] received referral for SN for disease/medication management - PT/OT and DME as needed. This nurse verified with the patient the correct address and contact number - patient lives in and independent senior living community. DME: per patient, she does not use any AD but does own a glucometer but needs more supplies which she will advise the PCP of at her follow up appointment. Referral called to [facility] central intake for completion and visit scheduling. Patient requests the nurse call plenty of time in advance prior to visiting - S. Johnn Spatz LPN

## 2018-01-27 NOTE — DISCHARGE SUMMARY
[address] [place]  MR#: [id number]  : 1940  ACCOUNT #: [de-identified]   ADMIT DATE: 2018  DISCHARGE DATE: 2018    DISPOSITION:  Discharged to home with home health care for PT, OT, DME as needed. DISCHARGE CONDITION:  Stable. DISCHARGE DIAGNOSES:  1.  Nausea, vomiting, could be viral gastroenteritis, now resolved. 2.  Flu A with viral bronchitis, much better. 3.  Bronchospasm, much better. 4.  History of chronic mild intermittent asthma. 5.  Fever secondary to flu, resolved. 6.  Diabetes mellitus with A1c of 7.2.  7.  Generalized weakness, better. 8.  Hypokalemia and hypomagnesemia. 9.  Lactic acidosis, resolved. DISCHARGE MEDICATIONS:  1. Tylenol 500 mg every 4 hours p.r.n. for pain or fever. 2.  Tessalon Perles 100 mg 3 times a day as needed for cough. 3.  Symbicort 80/4.5 two puffs b.i.d.  4.  Tamiflu 75 mg twice a day for three days. 5.  Zofran 4 mg q.8 hours p.r.n. for nausea. 6.  Albuterol inhaler 2 puffs q.4 hours p.r.n. for shortness of breath for three days, then every 4 hours as needed. 7.  Metformin home dose daily. 8.  Losartan 25 mg daily. 9.  Simvastatin 5 mg daily. 10.  Vitamin D 3000 units daily. IMAGING AND PROCEDURES:  1. Chest x-ray was done, reported bronchitis, no pneumonia. 2.  Blood culture x1 negative. 3.  Influenza A and B, was positive for type A.    HOSPITAL COURSE:  The patient was admitted to the hospital with a complaint of nausea and vomiting. Please see hospital admission H and P for further detail. The patient was found out to have positive for influenza A. She was admitted here with a diagnosis of gastroenteritis, most likely due to virus as well as influenza, bronchitis. Chest x-ray did not show any pneumonia. She initially had lactic acidosis and fever got better with IV fluid. Her lactic acid was 0.8. She did not have any more fever.   She was started on Tamiflu with improvement of her symptoms. Because the patient had some wheezing initially and history of asthma she was started on nebulizer and Symbicort, which will be continued at this point at least for next few days. On the day of discharge, she was feeling much better. She was walking in the room without any problem. She did not require any oxygen. She was saturating  percentage on room air. She did not have any wheezes or rhonchi on examination. She was tolerating diet very well. No more nausea or vomiting. She also had a bowel movement. Her labs remained stable. Blood culture remains negative. She will be discharged home with above-mentioned medication. DISCHARGE INSTRUCTION:    1. Diet:  ADA cardiac diet. 2.  Activity a tolerated. 3.  Follow with the PCP in three to five days. Total time is greater than 35 minutes.       MD COLBY Milner/[place]  D: 01/26/2018 12:56     T: 01/26/2018 23:54  JOB #: [id number]

## 2018-01-28 ENCOUNTER — HOME CARE VISIT (OUTPATIENT)
Dept: HOME HEALTH SERVICES | Facility: HOME HEALTH | Age: 78
End: 2018-01-28

## 2018-01-29 ENCOUNTER — PATIENT OUTREACH (OUTPATIENT)
Dept: INTERNAL MEDICINE CLINIC | Age: 78
End: 2018-01-29

## 2018-01-29 ENCOUNTER — TELEPHONE (OUTPATIENT)
Dept: CASE MANAGEMENT | Age: 78
End: 2018-01-29

## 2018-01-29 NOTE — PROGRESS NOTES
Carolina Manrique is a 78 y.o. female   This patient was received as a referral from inpatient admission     Nurse Navigator attempted to contact patient post discharge from [facility]; 1/24/2018 to 1/26/2018 for flu A with viral bronchitis. Contact number for the patient is the granddaughter benedict, whose the Medical POA. Left message for the patient to contact the office on the voicemail. Patient presenting symptoms:   Nausea  Vomiting  SOB  Chest pain  Cough  Congestion     Summary of patients top three problems:     Problem 1: Flu A with viral bronchitis: Tamiflu and Tessalon      Problem 2: Diabetes: monitor blood sugar and Metformin     Problem 3: Bronchospasm: Albuterol and Symbicort       Plan of care:  1. Take medications as prescribed  2. Attend all follow up appointments  3. [facility] PT/OT  4. Monitor for infection  5. Good hand hygiene  6. Monitor blood sugar     Appointments:  MARYJANE [id number] 1/30/2018 at 1130 with Dr. Stanton Rhodes medications prescribed:   Tylenol   Tessalon   Symbicort   Tamiflu   Zofran   Albuterol    Advance Care Planning:   Medical Power of  on file    Utilization in the past 12 months:  1 hospitalization and 0 ED    RRAT score:   9    Goals      Attends follow-up appointments as directed. Follow up with PCP       Knowledge and adherence of prescribed medication (ie. action, side effects, missed dose, etc.). Medication adherence   Tylenol   Tessalon   Symbicort   Tamiflu   Zofran   Albuterol        Prevent complications post hospitalization. No admissions within 30 days post discharge, 1/26/2018       Supportive resources in place to maintain patient in the community (ie. Home Health, DME equipment, refer to, medication assistant plan, etc.)            [facility] PT/OT       Understands red flags post discharge.             Monitor for signs and symptoms of infection   Good hand hygiene

## 2018-01-29 NOTE — TELEPHONE ENCOUNTER
Spoke with Melissa Fan granddaughter, she states that patient is doing a lot better. They were able to get her prescriptions filled and she has her follow up appointments scheduled. She had no further questions or concerns for me at this time.

## 2018-01-30 ENCOUNTER — OFFICE VISIT (OUTPATIENT)
Dept: INTERNAL MEDICINE CLINIC | Age: 78
End: 2018-01-30

## 2018-01-30 ENCOUNTER — PATIENT OUTREACH (OUTPATIENT)
Dept: INTERNAL MEDICINE CLINIC | Age: 78
End: 2018-01-30

## 2018-01-30 VITALS
HEIGHT: 59 IN | DIASTOLIC BLOOD PRESSURE: 76 MMHG | OXYGEN SATURATION: 96 % | BODY MASS INDEX: 30.04 KG/M2 | SYSTOLIC BLOOD PRESSURE: 106 MMHG | HEART RATE: 75 BPM | WEIGHT: 149 LBS | TEMPERATURE: 98.9 F | RESPIRATION RATE: 20 BRPM

## 2018-01-30 DIAGNOSIS — E11.9 CONTROLLED TYPE 2 DIABETES MELLITUS WITHOUT COMPLICATION, WITHOUT LONG-TERM CURRENT USE OF INSULIN (HCC): ICD-10-CM

## 2018-01-30 DIAGNOSIS — Z23 ENCOUNTER FOR IMMUNIZATION: ICD-10-CM

## 2018-01-30 DIAGNOSIS — J45.20 MILD INTERMITTENT ASTHMA, UNSPECIFIED WHETHER COMPLICATED: ICD-10-CM

## 2018-01-30 DIAGNOSIS — J40 BRONCHITIS: ICD-10-CM

## 2018-01-30 DIAGNOSIS — J09.X2 INFLUENZA A (H5N1): Primary | ICD-10-CM

## 2018-01-30 DIAGNOSIS — Z00.00 WELLNESS EXAMINATION: ICD-10-CM

## 2018-01-30 DIAGNOSIS — I10 ESSENTIAL HYPERTENSION: ICD-10-CM

## 2018-01-30 DIAGNOSIS — E78.5 HYPERLIPIDEMIA, UNSPECIFIED HYPERLIPIDEMIA TYPE: ICD-10-CM

## 2018-01-30 DIAGNOSIS — S37.30XS TRAUMA OF URETHRA, SEQUELA: ICD-10-CM

## 2018-01-30 PROBLEM — S37.30XA TRAUMA OF URETHRA: Status: ACTIVE | Noted: 2018-01-30

## 2018-01-30 LAB
BACTERIA SPEC CULT: NORMAL
SERVICE CMNT-IMP: NORMAL

## 2018-01-30 RX ORDER — METFORMIN HYDROCHLORIDE 500 MG/1
500 TABLET ORAL
Qty: 90 TAB | Refills: 3 | Status: SHIPPED | OUTPATIENT
Start: 2018-01-30 | End: 2018-05-19

## 2018-01-30 NOTE — PROGRESS NOTES
Goals Addressed             Most Recent     COMPLETED: Attends follow-up appointments as directed. On track (1/30/2018)             Follow up with PCP   Patient attended their post discharge follow up appointment with Dr. Denilson Pretty as scheduled.

## 2018-01-30 NOTE — PROGRESS NOTES
EULOGIO Das is a 78 y.o. female with relevant past medical history of DM2, HTN, HLD, asthma- mild intermittent who presents today for follow up after recent admission to [facility] for bronchitis/influeza A 1/25/18-1/26/18. The patient was having nausea, vomiting, fever, weakness, was found by another person that lives in her residence [facility] and her daughter was contacted who decided to call an ambulance. In the hospital she was found to have bronchospasm, dehdyration with elevated lactic acid improved after IVF. She tested positive for influenza and was given treatment with tamiflu, completed. CXR was consistent with bronchitis but no pneumonia. Her Hb A1c in the hospital was 7.2. The patient admits she had not been taking her metformin daily, tells me her previous PCP Dr. Ziyad Mehta, gave her a supply but told her it was not necessary to take daily. The patient does not bring any of her medications today, notes from previous PCP not available at this time. She says she exercises regularly, three times weekly and tries to eat healthy and has been able to control her diabetes this way, however, she has not been checking her blood sugar regularly because she ran out of supplies for her glucometer (does not recall which brand). She tells me her daughter had DMs and CKD and recently passed away from renal complications. She is due for eye exam, denies any numbness, tingling, burning sensation. She also has h/o HTN, HLD and she takes low dose losartan and simvastatin. Her BP is well controlled. We do not have a previous lipid profile, she says it was last done about a year ago. She has family history of MI (mother at 68yo). Denies any chest pain, SOB, palpitations, dizziness, HA, syncopal episodes, leg swelling, orthopnea, NPD. She had an Echo done 2/6/11 with EF 60%, mild TR.     In terms of her asthma, she says it is mild, with intermittent episodes every other year, sometimes more, she does not use inhalers regularly. Denies any daily symptoms, weekly symptoms or nocturnal symptoms. Denies any seasonal allergies. She is highly allergic to aspirin (anaphylaxis). She mentions to me she had cholecystectomy and a tumor the \"size of a football\" in her \"stomach\" that tested negative for cancer and it was removed at the same time her gallbladder was removed approximately more than 20 years ago. She says they also did a hysterectomy, denies any h/o abnormal vaginal bleeding or anemia. She also mentions an episode in the hospital where she pulled out her urinary catheter (does not recall why she had it in the first place) and it injured her urethra, and ever since she cannot have a urinary catheter inserted in her urethra, the last time she needed one (she also does not recall why she had it placed) the doctors had to put it in her abdomen. She denies any hematuria, dysuria, frequency, incontinence or urinary stream changes. In terms of HM, she says she would like to have the pneumonia vaccine, declines influenza vaccine, VZV, willing to get Tdap. States she had a colonoscopy last year (will try to get records from previous PCP) and a mammogram last year around summer time, and it was normal.     Review of Systems   Constitutional: Positive for chills, diaphoresis, fever, malaise/fatigue and weight loss. HENT: Negative for congestion, ear discharge, ear pain, hearing loss, nosebleeds, sinus pain, sore throat and tinnitus. Eyes: Negative for blurred vision, double vision, photophobia, pain, discharge and redness. Respiratory: Negative for cough, hemoptysis, sputum production, shortness of breath, wheezing and stridor. Cardiovascular: Negative for chest pain, palpitations, orthopnea, claudication, leg swelling and PND. Gastrointestinal: Negative for abdominal pain, blood in stool, constipation, diarrhea, heartburn, melena, nausea and vomiting.    Genitourinary: Negative for dysuria, flank pain, frequency, hematuria and urgency. Musculoskeletal: Negative for back pain, falls, joint pain, myalgias and neck pain. Skin: Negative for itching and rash. Neurological: Positive for weakness. Negative for dizziness, tingling, tremors, sensory change, speech change, focal weakness, seizures, loss of consciousness and headaches. Endo/Heme/Allergies: Negative for environmental allergies and polydipsia. Does not bruise/bleed easily. Psychiatric/Behavioral: Negative for depression. The patient is not nervous/anxious. Past Medical History  Past Medical History:   Diagnosis Date    Asthma     Diabetes ([address]     Hypertension      Past Surgical History:   Procedure Laterality Date    HX CHOLECYSTECTOMY          Family History  Family History   Problem Relation Age of Onset    Heart Disease Mother     Heart Disease Father     Cancer Father        Social History  She  reports that she has never smoked. She has never used smokeless tobacco.   History   Alcohol Use No   Denies illicit drug use    Immunization History    There is no immunization history on file for this patient. Allergies  Allergies   Allergen Reactions    Aspirin Anaphylaxis       Medications  Current Outpatient Prescriptions   Medication Sig    pneumococcal 13 estefany conj dip (PREVNAR-13) 0.5 mL syrg injection 0.5 mL by IntraMUSCular route once for 1 dose.  acetaminophen (TYLENOL) 500 mg tablet Take 1 Tab by mouth every four (4) hours as needed. Indications: Fever, Pain    cholecalciferol (VITAMIN D3) 1,000 unit cap Take  by mouth daily.  losartan (COZAAR) 25 mg tablet Take 25 mg by mouth daily. Indications: hypertension    simvastatin (ZOCOR) 5 mg tablet Take 5 mg by mouth nightly.  benzonatate (TESSALON) 100 mg capsule Take 1 Cap by mouth three (3) times daily as needed for Cough.  budesonide-formoterol (SYMBICORT) 80-4.5 mcg/actuation HFAA Take 2 Puffs by inhalation two (2) times a day.     ondansetron (ZOFRAN ODT) 4 mg disintegrating tablet Take 1 Tab by mouth every eight (8) hours as needed for Nausea.  albuterol (PROVENTIL HFA, VENTOLIN HFA, PROAIR HFA) 90 mcg/actuation inhaler 2 PUFFS EVERY 4 HOURS while awake for 3 days then every 4 hours as needed for shortness of breath and/or wheezing    METFORMIN HCL (METFORMIN PO) Take  by mouth. No current facility-administered medications for this visit. Visit Vitals    /76 (BP 1 Location: Left arm, BP Patient Position: Sitting)    Pulse 75    Temp 98.9 °F (37.2 °C) (Oral)    Resp 20    Ht 4' 11\" (1.499 m)    Wt 149 lb (67.6 kg)    SpO2 96%    BMI 30.09 kg/m2     Body mass index is 30.09 kg/(m^2). Physical Exam   Constitutional: She is oriented to person, place, and time and well-developed, well-nourished, and in no distress. No distress. HENT:   Head: Normocephalic and atraumatic. Right Ear: External ear normal.   Left Ear: External ear normal.   Nose: Nose normal.   Mouth/Throat: Oropharynx is clear and moist. No oropharyngeal exudate. Left lower lip cold sore   Eyes: Conjunctivae and EOM are normal. Pupils are equal, round, and reactive to light. Right eye exhibits discharge. Left eye exhibits no discharge. No scleral icterus. Neck: Normal range of motion. Neck supple. No JVD present. No tracheal deviation present. No thyromegaly present. Pulmonary/Chest: She is in respiratory distress (dysphonia, mild Dyspnea sitting down). She has wheezes (Right lower lung). She has no rales. She exhibits no tenderness. Abdominal: Soft. Bowel sounds are normal. She exhibits no distension and no mass. There is no tenderness. There is no rebound and no guarding. Musculoskeletal: Normal range of motion. She exhibits no edema, tenderness or deformity. Foot exam done  No lesions, callus, ulcers, normal sensory grossly, adequate distal pedial pulses bilaterally and capillary refill <2 sc   Lymphadenopathy:     She has no cervical adenopathy. Neurological: She is alert and oriented to person, place, and time. Gait normal.   Skin: Skin is warm. No rash noted. She is not diaphoretic. No erythema. No pallor. Some bruises in arms in sites of IV access during recent hospitalization     Psychiatric: Mood and affect normal.         REVIEW OF DATA    Labs  Admission on 01/24/2018, Discharged on 01/26/2018   Component Date Value Ref Range Status    Sodium 01/24/2018 134* 136 - 145 mmol/L Final    Potassium 01/24/2018 3.6  3.5 - 5.5 mmol/L Final    Chloride 01/24/2018 95* 100 - 108 mmol/L Final    CO2 01/24/2018 26  21 - 32 mmol/L Final    Anion gap 01/24/2018 13  3.0 - 18 mmol/L Final    Glucose 01/24/2018 202* 74 - 99 mg/dL Final    BUN 01/24/2018 13  7.0 - 18 MG/DL Final    Creatinine 01/24/2018 0.99  0.6 - 1.3 MG/DL Final    BUN/Creatinine ratio 01/24/2018 13  12 - 20   Final    GFR est AA 01/24/2018 >60  >60 ml/min/1.73m2 Final    GFR est non-AA 01/24/2018 55* >60 ml/min/1.73m2 Final    Calcium 01/24/2018 9.4  8.5 - 10.1 MG/DL Final    WBC 01/24/2018 9.2  4.6 - 13.2 K/uL Final    RBC 01/24/2018 4.97  4.20 - 5.30 M/uL Final    HGB 01/24/2018 13.3  12.0 - 16.0 g/dL Final    HCT 01/24/2018 40.2  35.0 - 45.0 % Final    MCV 01/24/2018 80.9  74.0 - 97.0 FL Final    MCH 01/24/2018 26.8  24.0 - 34.0 PG Final    MCHC 01/24/2018 33.1  31.0 - 37.0 g/dL Final    RDW 01/24/2018 13.3  11.6 - 14.5 % Final    PLATELET 40/52/0872 296* 135 - 420 K/uL Final    MPV 01/24/2018 13.1* 9.2 - 11.8 FL Final    NEUTROPHILS 01/24/2018 75* 40 - 73 % Final    LYMPHOCYTES 01/24/2018 15* 21 - 52 % Final    MONOCYTES 01/24/2018 9  3 - 10 % Final    EOSINOPHILS 01/24/2018 1  0 - 5 % Final    BASOPHILS 01/24/2018 0  0 - 2 % Final    ABS. NEUTROPHILS 01/24/2018 7.0  1.8 - 8.0 K/UL Final    ABS. LYMPHOCYTES 01/24/2018 1.3  0.9 - 3.6 K/UL Final    ABS. MONOCYTES 01/24/2018 0.8  0.05 - 1.2 K/UL Final    ABS.  EOSINOPHILS 01/24/2018 0.1  0.0 - 0.4 K/UL Final    ABS. BASOPHILS 01/24/2018 0.0  0.0 - 0.06 K/UL Final    DF 01/24/2018 AUTOMATED    Final    Color 01/24/2018 DARK YELLOW    Final    Appearance 01/24/2018 CLEAR    Final    Specific gravity 01/24/2018 1.022  1.005 - 1.030   Final    pH (UA) 01/24/2018 7.5  5.0 - 8.0   Final    Protein 01/24/2018 30* NEG mg/dL Final    Glucose 01/24/2018 NEGATIVE   NEG mg/dL Final    Ketone 01/24/2018 40* NEG mg/dL Final    Bilirubin 01/24/2018 NEGATIVE   NEG   Final    Blood 01/24/2018 NEGATIVE   NEG   Final    Urobilinogen 01/24/2018 1.0  0.2 - 1.0 EU/dL Final    Nitrites 01/24/2018 NEGATIVE   NEG   Final    Leukocyte Esterase 01/24/2018 SMALL* NEG   Final    Lactic Acid (POC) 01/24/2018 2.5* 0.4 - 2.0 mmol/L Final    Influenza A Antigen 01/24/2018 POSITIVE* NEG   Final    Influenza B Antigen 01/24/2018 NEGATIVE   NEG   Final    Special Requests: 01/24/2018 NO SPECIAL REQUESTS    Final    Culture result: 01/24/2018 NO GROWTH 6 DAYS    Final    WBC 01/24/2018 0 to 3  0 - 4 /hpf Final    RBC 01/24/2018 0 to 3  0 - 5 /hpf Final    Epithelial cells 01/24/2018 4+  0 - 5 /lpf Final    Sodium 01/25/2018 136  136 - 145 mmol/L Final    Potassium 01/25/2018 3.1* 3.5 - 5.5 mmol/L Final    Chloride 01/25/2018 103  100 - 108 mmol/L Final    CO2 01/25/2018 24  21 - 32 mmol/L Final    Anion gap 01/25/2018 9  3.0 - 18 mmol/L Final    Glucose 01/25/2018 383* 74 - 99 mg/dL Final    BUN 01/25/2018 16  7.0 - 18 MG/DL Final    Creatinine 01/25/2018 0.90  0.6 - 1.3 MG/DL Final    BUN/Creatinine ratio 01/25/2018 18  12 - 20   Final    GFR est AA 01/25/2018 >60  >60 ml/min/1.73m2 Final    GFR est non-AA 01/25/2018 >60  >60 ml/min/1.73m2 Final    Calcium 01/25/2018 8.0* 8.5 - 10.1 MG/DL Final    Bilirubin, total 01/25/2018 0.5  0.2 - 1.0 MG/DL Final    ALT (SGPT) 01/25/2018 26  13 - 56 U/L Final    AST (SGOT) 01/25/2018 21  15 - 37 U/L Final    Alk.  phosphatase 01/25/2018 53  45 - 117 U/L Final    Protein, total 01/25/2018 6.7  6.4 - 8.2 g/dL Final    Albumin 01/25/2018 3.0* 3.4 - 5.0 g/dL Final    Globulin 01/25/2018 3.7  2.0 - 4.0 g/dL Final    A-G Ratio 01/25/2018 0.8  0.8 - 1.7   Final    Hemoglobin A1c 01/25/2018 7.2* 4.2 - 5.6 % Final    Est. average glucose 01/25/2018 160  mg/dL Final    Magnesium 01/25/2018 1.6  1.6 - 2.6 mg/dL Final    Phosphorus 01/25/2018 2.8  2.5 - 4.9 MG/DL Final    WBC 01/25/2018 5.6  4.6 - 13.2 K/uL Final    RBC 01/25/2018 4.10* 4.20 - 5.30 M/uL Final    HGB 01/25/2018 11.0* 12.0 - 16.0 g/dL Final    HCT 01/25/2018 33.5* 35.0 - 45.0 % Final    MCV 01/25/2018 81.7  74.0 - 97.0 FL Final    MCH 01/25/2018 26.8  24.0 - 34.0 PG Final    MCHC 01/25/2018 32.8  31.0 - 37.0 g/dL Final    RDW 01/25/2018 13.2  11.6 - 14.5 % Final    PLATELET 24/84/7717 516* 135 - 420 K/uL Final    MPV 01/25/2018 13.3* 9.2 - 11.8 FL Final    NEUTROPHILS 01/25/2018 93* 40 - 73 % Final    LYMPHOCYTES 01/25/2018 6* 21 - 52 % Final    MONOCYTES 01/25/2018 1* 3 - 10 % Final    EOSINOPHILS 01/25/2018 0  0 - 5 % Final    BASOPHILS 01/25/2018 0  0 - 2 % Final    ABS. NEUTROPHILS 01/25/2018 5.2  1.8 - 8.0 K/UL Final    ABS. LYMPHOCYTES 01/25/2018 0.4* 0.9 - 3.6 K/UL Final    ABS. MONOCYTES 01/25/2018 0.1  0.05 - 1.2 K/UL Final    ABS. EOSINOPHILS 01/25/2018 0.0  0.0 - 0.4 K/UL Final    ABS.  BASOPHILS 01/25/2018 0.0  0.0 - 0.1 K/UL Final    DF 01/25/2018 AUTOMATED    Final    Lactic acid 01/25/2018 3.0* 0.4 - 2.0 MMOL/L Final    Glucose (POC) 01/25/2018 336* 70 - 110 mg/dL Final    Glucose (POC) 01/25/2018 112* 70 - 110 mg/dL Final    Glucose (POC) 01/25/2018 70  70 - 110 mg/dL Final    Glucose (POC) 01/25/2018 104  70 - 110 mg/dL Final    Sodium 01/26/2018 143  136 - 145 mmol/L Final    Potassium 01/26/2018 3.9  3.5 - 5.5 mmol/L Final    Chloride 01/26/2018 112* 100 - 108 mmol/L Final    CO2 01/26/2018 24  21 - 32 mmol/L Final    Anion gap 01/26/2018 7  3.0 - 18 mmol/L Final    Glucose 01/26/2018 119* 74 - 99 mg/dL Final    BUN 01/26/2018 25* 7.0 - 18 MG/DL Final    Creatinine 01/26/2018 0.65  0.6 - 1.3 MG/DL Final    BUN/Creatinine ratio 01/26/2018 38* 12 - 20   Final    GFR est AA 01/26/2018 >60  >60 ml/min/1.73m2 Final    GFR est non-AA 01/26/2018 >60  >60 ml/min/1.73m2 Final    Calcium 01/26/2018 7.9* 8.5 - 10.1 MG/DL Final    Magnesium 01/26/2018 2.5  1.6 - 2.6 mg/dL Final    WBC 01/26/2018 10.0  4.6 - 13.2 K/uL Final    RBC 01/26/2018 3.98* 4.20 - 5.30 M/uL Final    HGB 01/26/2018 10.8* 12.0 - 16.0 g/dL Final    HCT 01/26/2018 32.9* 35.0 - 45.0 % Final    MCV 01/26/2018 82.7  74.0 - 97.0 FL Final    MCH 01/26/2018 27.1  24.0 - 34.0 PG Final    MCHC 01/26/2018 32.8  31.0 - 37.0 g/dL Final    RDW 01/26/2018 13.6  11.6 - 14.5 % Final    PLATELET 50/39/4268 366* 135 - 420 K/uL Final    MPV 01/26/2018 13.4* 9.2 - 11.8 FL Final    NEUTROPHILS 01/26/2018 78* 40 - 73 % Final    LYMPHOCYTES 01/26/2018 15* 21 - 52 % Final    MONOCYTES 01/26/2018 7  3 - 10 % Final    EOSINOPHILS 01/26/2018 0  0 - 5 % Final    BASOPHILS 01/26/2018 0  0 - 2 % Final    ABS. NEUTROPHILS 01/26/2018 7.8  1.8 - 8.0 K/UL Final    ABS. LYMPHOCYTES 01/26/2018 1.5  0.9 - 3.6 K/UL Final    ABS. MONOCYTES 01/26/2018 0.7  0.05 - 1.2 K/UL Final    ABS. EOSINOPHILS 01/26/2018 0.0  0.0 - 0.4 K/UL Final    ABS. BASOPHILS 01/26/2018 0.0  0.0 - 0.1 K/UL Final    DF 01/26/2018 AUTOMATED    Final    Lactic acid 01/26/2018 0.8  0.4 - 2.0 MMOL/L Final    Glucose (POC) 01/26/2018 107  70 - 110 mg/dL Final    Glucose (POC) 01/26/2018 143* 70 - 110 mg/dL Final       Imaging  Relevant imaging reviewed as indicated    Other  No other pertinent to this encounter       DIAGNOSIS AND PLAN  Patient Active Problem List   Diagnosis Code    Bronchospasm J98.01    Hypoxia R09.02    Influenza A (H5N1) J09. X2     1.  Influenza A (H5N1)  Recent admission to [facility] after being found unable to move at [facility], the patient was having nausea, vomiting, fever, weakness, bronchospasm, found dehydrated with elevated lactic acid improved after IVF. Tested positive for influenza and was given treatment with tamiflu, completed. CXR was consistent with bronchitis but no pneumonia, according to notes and report. She is clinically improving, however persists with mild wheezing on right lower lobe of lung. Declined steroid therapy today, afraid of hyperglycemia, but willing to use inhalers instead. Has not been using inhalers as prescribed. - Recommended to start taking symbicort low dose daily BID as prescribed  - Recommended to use albuterol inhaler for rescue 2 puffs q6 hours PRN   - Follow up in 2 weeks for evaluation. 2. Bronchitis  Secondary to recent episode of influenza A. Clinically improving  Mild wheezing on RL lobe of lung  - The patient declined therapy with steroids, afraid that it would raise her blood sugar, she says even if prescribed she will not take them. - Recommended to use inhalers as prescribed (see below)    3. Mild intermittent asthma, unspecified whether complicated  Patient reports no daily symptoms or nocturnal symptoms, has intermittent exacerbations, every other year approximately, does not use inhalers often, even now. Reports no SOB or wheezing, also noted dyspeneic at rest and wheezing on physical exam  - Recommended to start taking symbicort low dose daily BID as prescribed  - Recommended to use albuterol inhaler for rescue 2 puffs q6 hours PRN    4. Controlled type 2 diabetes mellitus without complication, without long-term current use of insulin ([facility])  HbA1c 7.2 on 1/25/18  - Recommended patient to resume metformin daily, 500 mg dose, to call back if patient does not have this dose at home  - MICROALBUMIN, UR, RAND W/ MICROALBUMIN/CREA RATIO; Future    5. Essential hypertension  Controlled on low dose losartan    6.  Hyperlipidemia, unspecified hyperlipidemia type  We will check lipid profile    7. Wellness examination  Screening labs ordered, recent CBC, CMP and HbA1c done in the hospital and reviewed  Planning medicare visit next encounter in 2 weeks  - LIPID PANEL; Future  - TSH AND FREE T4; Future  - VITAMIN D, 25 HYDROXY; Future  - URINALYSIS W/ RFLX MICROSCOPIC; Future  - MICROALBUMIN, UR, RAND W/ MICROALBUMIN/CREA RATIO; Future  - pneumococcal 13 estefany conj dip (PREVNAR-13) 0.5 mL syrg injection; 0.5 mL by IntraMUSCular route once for 1 dose. Dispense: 0.5 mL; Refill: 0      Told to bring all medication bottles and glucometer to next encounter  Tdap and medicare wellness exam next visit    Follow-up Disposition:  Return in about 2 weeks (around 2/13/2018). Cami España MD

## 2018-01-30 NOTE — MR AVS SNAPSHOT
[address] [place], [place] [address] [facility], [place], operated by [facility], [address] 
[phone] Patient: Pippa Obrien MRN: [id number] TDZ:9/5/3798 Visit Information Date & Time Provider Department Dept. Phone Encounter #  
 1/30/2018 11:30 AM Amelia Garcia MD [facility] [phone] Follow-up Instructions Return in about 2 weeks (around 2/13/2018). Upcoming Health Maintenance Date Due DTaP/Tdap/Td series (1 - Tdap) 8/3/1963 ZOSTER VACCINE AGE 60> 6/3/2002 GLAUCOMA SCREENING Q2Y 8/3/2007 Pneumococcal 65+ Low/Medium Risk (1 of 2 - PCV13) 8/3/2007 MEDICARE YEARLY EXAM 8/3/2007 Allergies as of 1/30/2018  Review Complete On: 1/30/2018 By: Amelia Garcia MD  
  
 Severity Noted Reaction Type Reactions Aspirin High 01/24/2018    Anaphylaxis Current Immunizations  Never Reviewed No immunizations on file. Not reviewed this visit You Were Diagnosed With   
  
 Codes Comments Influenza A (H5N1)    -  Primary ICD-10-CM: J09. X2 
ICD-9-CM: 488.02 Bronchitis     ICD-10-CM: J40 ICD-9-CM: 214 Mild intermittent asthma, unspecified whether complicated     [facility]-70-CW: J45.20 ICD-9-CM: 493.90 Controlled type 2 diabetes mellitus without complication, without long-term current use of insulin ([facility] 75.)     ICD-10-CM: E11.9 ICD-9-CM: 250.00 Essential hypertension     ICD-10-CM: I10 
ICD-9-CM: 401.9 Hyperlipidemia, unspecified hyperlipidemia type     ICD-10-CM: E78.5 ICD-9-CM: 272.4 Wellness examination     ICD-10-CM: Z00.00 ICD-9-CM: V70.0 Trauma of urethra, sequela     ICD-10-CM: S37.30XS ICD-9-CM: 577. 2 Vitals BP Pulse Temp Resp Height(growth percentile) Weight(growth percentile) 106/76 (BP 1 Location: Left arm, BP Patient Position: Sitting) 75 98.9 °F (37.2 °C) (Oral) 20 4' 11\" (1.499 m) 149 lb (67.6 kg) SpO2 BMI Smoking Status 96% 30.09 kg/m2 Never Smoker Vitals History BMI and BSA Data Body Mass Index Body Surface Area 30.09 kg/m 2 1.68 m 2 Your Updated Medication List  
  
   
This list is accurate as of: 18  1:07 PM.  Always use your most recent med list.  
  
  
  
  
 acetaminophen 500 mg tablet Commonly known as:  TYLENOL Take 1 Tab by mouth every four (4) hours as needed. Indications: Fever, Pain  
  
 albuterol 90 mcg/actuation inhaler Commonly known as:  PROVENTIL HFA, VENTOLIN HFA, PROAIR HFA  
2 PUFFS EVERY 4 HOURS while awake for 3 days then every 4 hours as needed for shortness of breath and/or wheezing  
  
 benzonatate 100 mg capsule Commonly known as:  TESSALON Take 1 Cap by mouth three (3) times daily as needed for Cough. budesonide-formoterol 80-4.5 mcg/actuation Hfaa Commonly known as:  SYMBICORT Take 2 Puffs by inhalation two (2) times a day. cholecalciferol 1,000 unit Cap Commonly known as:  VITAMIN D3 Take  by mouth daily. losartan 25 mg tablet Commonly known as:  COZAAR Take 25 mg by mouth daily. Indications: hypertension  
  
 metFORMIN 500 mg tablet Commonly known as:  GLUCOPHAGE Take 1 Tab by mouth daily (with breakfast). ondansetron 4 mg disintegrating tablet Commonly known as:  ZOFRAN ODT Take 1 Tab by mouth every eight (8) hours as needed for Nausea. pneumococcal 13 estefany conj dip 0.5 mL Syrg injection Commonly known as:  PREVNAR-13  
0.5 mL by IntraMUSCular route once for 1 dose. simvastatin 5 mg tablet Commonly known as:  ZOCOR Take 5 mg by mouth nightly. Prescriptions Printed Refills  
 pneumococcal 13 estefany conj dip (PREVNAR-13) 0.5 mL syrg injection 0 Si.5 mL by IntraMUSCular route once for 1 dose. Class: Print Route: IntraMUSCular  
 metFORMIN (GLUCOPHAGE) 500 mg tablet 3 Sig: Take 1 Tab by mouth daily (with breakfast). Class: Print  Route: Oral  
  
 Follow-up Instructions Return in about 2 weeks (around 2/13/2018). To-Do List   
 01/30/2018 Lab:  LIPID PANEL   
  
 01/30/2018 Lab:  MICROALBUMIN, UR, RAND W/ MICROALBUMIN/CREA RATIO   
  
 01/30/2018 Lab:  TSH AND FREE T4   
  
 01/30/2018 Lab:  URINALYSIS W/ RFLX MICROSCOPIC   
  
 01/30/2018 Lab:  VITAMIN D, 25 HYDROXY   
  
 02/01/2018 To Be Determined Appointment with Marquita Agee RN at [address] [place] [address] SCHEDULING/INTAKE  
  
 02/01/2018 9:30 AM  
  Appointment with [facility] BONE DEXA RM 1 at [facility] RAD BONE DENSITY ([phone]) OUTSIDE FILMS  - Any outside films related to the study being scheduled should be brought with you on the day of the exam.  If this cannot be done there may be a delay in the reading of the study. MEDICATIONS  - Patient must bring a complete list of all medications currently taking to include prescriptions, over-the-counter meds, herbals, vitamins & any dietary supplements - Patient must discontinue use of calcium, vitamins, or calcium supplements including antacids (calcium based) 24 hours before scan. GENERAL INSTRUCTIONS  - [facility] cannot accommodate patients on stretchers, patient must be able to walk into the room and be able to sit up for a portion of the exam.  
  
  
Introducing [facility] & HEALTH SERVICES! [employer] introduces [employer] patient portal. Now you can access parts of your medical record, email your doctor's office, and request medication refills online. 1. In your internet browser, go to [email or web address] 2. Click on the First Time User? Click Here link in the Sign In box. You will see the New Member Sign Up page. 3. Enter your [employer] Access Code exactly as it appears below. You will not need to use this code after youve completed the sign-up process. If you do not sign up before the expiration date, you must request a new code.  
 
· [employer] Access Code: [email or web address] 
 Expires: 4/25/2018  9:14 AM 
 
4. Enter the last four digits of your Social Security Number (xxxx) and Date of Birth (mm/dd/yyyy) as indicated and click Submit. You will be taken to the next sign-up page. 5. Create a [employer] ID. This will be your [employer] login ID and cannot be changed, so think of one that is secure and easy to remember. 6. Create a [employer] password. You can change your password at any time. 7. Enter your Password Reset Question and Answer. This can be used at a later time if you forget your password. 8. Enter your e-mail address. You will receive e-mail notification when new information is available in [address] 9. Click Sign Up. You can now view and download portions of your medical record. 10. Click the Download Summary menu link to download a portable copy of your medical information. If you have questions, please visit the Frequently Asked Questions section of the [employer] website. Remember, [employer] is NOT to be used for urgent needs. For medical emergencies, dial 911. Now available from your iPhone and Android! Please provide this summary of care documentation to your next provider. Your primary care clinician is listed as Claudia Charles. If you have any questions after today's visit, please call [phone].

## 2018-01-30 NOTE — PROGRESS NOTES
1. Have you been to the ER, urgent care clinic or hospitalized since your last visit? YES.     2. Have you seen or consulted any other health care providers outside of the [facility] [place] since your last visit (Include any pap smears or colon screening)? NO      Do you have an Advanced Directive? YES    Would you like information on Advanced Directives?  NO

## 2018-01-31 LAB
25(OH)D3+25(OH)D2 SERPL-MCNC: 20.5 NG/ML (ref 30–100)
ALBUMIN/CREAT UR: 3.4 MG/G CREAT (ref 0–30)
APPEARANCE UR: ABNORMAL
BACTERIA #/AREA URNS HPF: ABNORMAL /[HPF]
BILIRUB UR QL STRIP: NEGATIVE
CASTS URNS QL MICRO: ABNORMAL /LPF
CHOLEST SERPL-MCNC: 145 MG/DL (ref 100–199)
COLOR UR: YELLOW
CREAT UR-MCNC: 136.9 MG/DL
CRYSTALS URNS MICRO: ABNORMAL
EPI CELLS #/AREA URNS HPF: ABNORMAL /HPF
GLUCOSE UR QL: NEGATIVE
HDLC SERPL-MCNC: 41 MG/DL
HGB UR QL STRIP: NEGATIVE
INTERPRETATION, 910389: NORMAL
KETONES UR QL STRIP: NEGATIVE
LDLC SERPL CALC-MCNC: 79 MG/DL (ref 0–99)
LEUKOCYTE ESTERASE UR QL STRIP: ABNORMAL
Lab: NORMAL
MICRO URNS: ABNORMAL
MICROALBUMIN UR-MCNC: 4.7 UG/ML
MUCOUS THREADS URNS QL MICRO: PRESENT
NITRITE UR QL STRIP: NEGATIVE
PH UR STRIP: 5 [PH] (ref 5–7.5)
PROT UR QL STRIP: NEGATIVE
RBC #/AREA URNS HPF: ABNORMAL /HPF
SP GR UR: 1.02 (ref 1–1.03)
T4 FREE SERPL-MCNC: 1.56 NG/DL (ref 0.82–1.77)
TRIGL SERPL-MCNC: 123 MG/DL (ref 0–149)
TSH SERPL DL<=0.005 MIU/L-ACNC: 1.05 UIU/ML (ref 0.45–4.5)
UNIDENT CRYS URNS QL MICRO: PRESENT
UROBILINOGEN UR STRIP-MCNC: 0.2 MG/DL (ref 0.2–1)
VLDLC SERPL CALC-MCNC: 25 MG/DL (ref 5–40)
WBC #/AREA URNS HPF: ABNORMAL /HPF

## 2018-02-01 ENCOUNTER — HOME CARE VISIT (OUTPATIENT)
Dept: HOME HEALTH SERVICES | Facility: HOME HEALTH | Age: 78
End: 2018-02-01

## 2018-02-02 ENCOUNTER — TELEPHONE (OUTPATIENT)
Dept: INTERNAL MEDICINE CLINIC | Age: 78
End: 2018-02-02

## 2018-02-02 NOTE — TELEPHONE ENCOUNTER
Yolanda Martinez with [facility] care is calling to let us know that they had gotten an order from Dr. Pastor Machado office. They have tried contacting the patient but she either doesn't answer or tells them to call back later. She will not be admitted to home care.

## 2018-02-13 ENCOUNTER — OFFICE VISIT (OUTPATIENT)
Dept: INTERNAL MEDICINE CLINIC | Age: 78
End: 2018-02-13

## 2018-02-13 VITALS
SYSTOLIC BLOOD PRESSURE: 142 MMHG | WEIGHT: 154.2 LBS | RESPIRATION RATE: 18 BRPM | DIASTOLIC BLOOD PRESSURE: 70 MMHG | TEMPERATURE: 98.4 F | HEIGHT: 59 IN | HEART RATE: 65 BPM | OXYGEN SATURATION: 99 % | BODY MASS INDEX: 31.08 KG/M2

## 2018-02-13 DIAGNOSIS — J45.20 MILD INTERMITTENT ASTHMA WITHOUT COMPLICATION: ICD-10-CM

## 2018-02-13 DIAGNOSIS — E11.8 CONTROLLED TYPE 2 DIABETES MELLITUS WITH COMPLICATION, WITHOUT LONG-TERM CURRENT USE OF INSULIN (HCC): ICD-10-CM

## 2018-02-13 DIAGNOSIS — Z00.00 MEDICARE ANNUAL WELLNESS VISIT, SUBSEQUENT: ICD-10-CM

## 2018-02-13 DIAGNOSIS — Z23 ENCOUNTER FOR IMMUNIZATION: Primary | ICD-10-CM

## 2018-02-13 DIAGNOSIS — Z71.89 ADVANCED CARE PLANNING/COUNSELING DISCUSSION: ICD-10-CM

## 2018-02-13 DIAGNOSIS — Z13.39 SCREENING FOR ALCOHOLISM: ICD-10-CM

## 2018-02-13 DIAGNOSIS — Z12.11 SCREEN FOR COLON CANCER: ICD-10-CM

## 2018-02-13 DIAGNOSIS — Z13.31 SCREENING FOR DEPRESSION: ICD-10-CM

## 2018-02-13 RX ORDER — LANCETS
EACH MISCELLANEOUS
Qty: 1 EACH | Refills: 11 | Status: SHIPPED | OUTPATIENT
Start: 2018-02-13 | End: 2018-02-22 | Stop reason: SDUPTHER

## 2018-02-13 RX ORDER — PEN NEEDLE, DIABETIC 30 GX3/16"
NEEDLE, DISPOSABLE MISCELLANEOUS DAILY
Qty: 1 PACKAGE | Refills: 11 | Status: SHIPPED | OUTPATIENT
Start: 2018-02-13 | End: 2018-02-13 | Stop reason: CLARIF

## 2018-02-13 RX ORDER — CALCIUM CARBONATE 600 MG
600 TABLET ORAL 2 TIMES DAILY
COMMUNITY
End: 2021-11-14

## 2018-02-13 NOTE — MR AVS SNAPSHOT
[address] [facility] 
 
 
 [address], [address] 
[phone] Patient: Samantha Najera MRN: [id number] WNM:[birth year] Visit Information Date & Time Provider Department Dept. Phone Encounter #  
 2/13/2018 10:30 AM Mari Sims MD [phone] Follow-up Instructions Return in about 3 months (around 5/13/2018). Your Appointments 5/15/2018 10:30 AM  
Office Visit with Mari Sims MD  
[facility] [facility]) Appt Note: 3 month follow up  
 [address], [address] [facility] [address] [place]  
  
   
 [address], [address] Upcoming Health Maintenance Date Due  
 EYE EXAM RETINAL OR DILATED Q1 8/3/1952 DTaP/Tdap/Td series (1 - Tdap) 8/3/1963 ZOSTER VACCINE AGE 60> 6/3/2002 GLAUCOMA SCREENING Q2Y 8/3/2007 HEMOGLOBIN A1C Q6M 7/25/2018 Pneumococcal 65+ Low/Medium Risk (2 of 2 - PPSV23) 1/30/2019 MICROALBUMIN Q1 1/30/2019 LIPID PANEL Q1 1/30/2019 FOOT EXAM Q1 2/13/2019 MEDICARE YEARLY EXAM 2/14/2019 Allergies as of 2/13/2018  Review Complete On: 2/13/2018 By: Mari Sims MD  
  
 Severity Noted Reaction Type Reactions Aspirin High 01/24/2018    Anaphylaxis Current Immunizations  Never Reviewed Name Date Pneumococcal Conjugate (PCV-13) 1/30/2018 Tdap  Incomplete Not reviewed this visit You Were Diagnosed With   
  
 Codes Comments Encounter for immunization    -  Primary ICD-10-CM: S04 ICD-9-CM: V03.89 Medicare annual wellness visit, subsequent     ICD-10-CM: Z00.00 ICD-9-CM: V70.0 Screening for alcoholism     ICD-10-CM: Z13.89 ICD-9-CM: V79.1 Screening for depression     ICD-10-CM: Z13.89 ICD-9-CM: V79.0 Screen for colon cancer     ICD-10-CM: Z12.11 ICD-9-CM: V76.51   
 Body mass index 30.0-30.9, adult     ICD-10-CM: Z68.30 ICD-9-CM: V85.30 Controlled type 2 diabetes mellitus with complication, without long-term current use of insulin (HCC)     ICD-10-CM: E11.8 ICD-9-CM: 250.90 Vitals BP Pulse Temp Resp Height(growth percentile) Weight(growth percentile) 142/70 (BP 1 Location: Right arm, BP Patient Position: Sitting) 65 98.4 °F (36.9 °C) (Oral) 18 4' 11\" (1.499 m) 154 lb 3.2 oz (69.9 kg) SpO2 BMI Smoking Status 99% 31.14 kg/m2 Never Smoker Vitals History BMI and BSA Data Body Mass Index Body Surface Area  
 31.14 kg/m 2 1.71 m 2 Preferred Pharmacy Pharmacy Name Phone [facility] [id number] [facility] [place], [address] AT [address] [phone] Your Updated Medication List  
  
   
This list is accurate as of: 2/13/18 11:09 AM.  Always use your most recent med list.  
  
  
  
  
 acetaminophen 500 mg tablet Commonly known as:  TYLENOL Take 1 Tab by mouth every four (4) hours as needed. Indications: Fever, Pain  
  
 albuterol 90 mcg/actuation inhaler Commonly known as:  PROVENTIL HFA, VENTOLIN HFA, PROAIR HFA  
2 PUFFS EVERY 4 HOURS while awake for 3 days then every 4 hours as needed for shortness of breath and/or wheezing  
  
 budesonide-formoterol 80-4.5 mcg/actuation Hfaa Commonly known as:  SYMBICORT Take 2 Puffs by inhalation two (2) times a day. calcium carbonate 600 mg calcium (1,500 mg) tablet Commonly known as:  Vola Pill Take 600 mg by mouth two (2) times a day. cholecalciferol 1,000 unit Cap Commonly known as:  VITAMIN D3 Take  by mouth daily. glucose blood VI test strips strip Commonly known as:  ONETOUCH ULTRA TEST To check blood sugar daily Insulin Needles (Disposable) 31 gauge x 5/16\" Ndle  
by SubCUTAneous route daily. losartan 25 mg tablet Commonly known as:  COZAAR  
 Take 25 mg by mouth daily. Indications: hypertension  
  
 metFORMIN 500 mg tablet Commonly known as:  GLUCOPHAGE Take 1 Tab by mouth daily (with breakfast). ondansetron 4 mg disintegrating tablet Commonly known as:  ZOFRAN ODT Take 1 Tab by mouth every eight (8) hours as needed for Nausea. simvastatin 5 mg tablet Commonly known as:  ZOCOR Take 5 mg by mouth nightly. Prescriptions Sent to Pharmacy Refills  
 glucose blood VI test strips (ONETOUCH ULTRA TEST) strip 3 Sig: To check blood sugar daily Class: Normal  
 Pharmacy: [facility] [place] [id number] AT [address] [place] [address] Rd [address] Ph #: [phone] Insulin Needles, Disposable, 31 gauge x 5/16\" ndle 11 Sig: by SubCUTAneous route daily. Class: Normal  
 Pharmacy: [facility] [place] [address] AT [address] [place] [address] Rd [address] Ph #: [phone] Route: SubCUTAneous We Performed the Following Martin [address] [[id number] HCP] [place] ANNUAL ALCOHOL SCREEN 15 MIN [id number] HCP] [place] BEHAVIOR  OBESITY 15M [ HCP] REFERRAL TO OPTOMETRY [id number] Custom] TETANUS, DIPHTHERIA TOXOIDS AND ACELLULAR PERTUSSIS VACCINE (TDAP), IN INDIVIDS. >=7, IM [id number] CPT(R)] Follow-up Instructions Return in about 3 months (around 5/13/2018). Referral Information Referral ID Referred By Referred To  
  
 [id number] GALEN Yen Not Available Visits Status Start Date End Date 1 New Request 2/13/18 2/13/19 If your referral has a status of pending review or denied, additional information will be sent to support the outcome of this decision. Patient Instructions Medicare Wellness Visit, Female The best way to live healthy is to have a healthy lifestyle by eating a well-balanced diet, exercising regularly, limiting alcohol and stopping smoking. Regular physical exams and screening tests are another way to keep healthy. Preventive exams provided by your health care provider can find health problems before they become diseases or illnesses. Preventive services including immunizations, screening tests, monitoring and exams can help you take care of your own health. All people over age 72 should have a pneumovax  and and a prevnar shot to prevent pneumonia. These are once in a lifetime unless you and your provider decide differently. All people over 65 should have a yearly flu shot and a tetanus vaccine every 10 years. A bone mass density to screen for osteoporosis or thinning of the bones should be done every 2 years after 65. Screening for diabetes mellitus with a blood sugar test should be done every year. Glaucoma is a disease of the eye due to increased ocular pressure that can lead to blindness and it should be done every year by an eye professional. 
 
Cardiovascular screening tests that check for elevated lipids (fatty part of blood) which can lead to heart disease and strokes should be done every 5 years. Colorectal screening that evaluates for blood or polyps in your colon should be done yearly as a stool test or every five years as a flexible sigmoidoscope or every 10 years as a colonoscopy up to age 76. Breast cancer screening with a mammogram is recommended biennially  for women age 54-69. Screening for cervical cancer with a pap smear and pelvic exam is recommended for women after age 72 years every 2 years up to age 79 or when the provider and patient decide to stop. If there is a history of cervical abnormalities or other increased risk for cancer then the test is recommended yearly. Hepatitis C screening is also recommended for anyone born between 80 through [address]. A shingles vaccine is also recommended once in a lifetime after age 61. Your Medicare Wellness Exam is recommended annually. Here is a list of your current Health Maintenance items with a due date: 
Health Maintenance Due Topic Date Due  
 Diabetic Foot Care  08/03/1950 Davina Rubin Eye Exam  08/03/1950  DTaP/Tdap/Td  (1 - Tdap) 08/03/1963  Shingles Vaccine  06/03/2002  Glaucoma Screening   08/03/2007 Davina Rubin Annual Well Visit  08/03/2007 Introducing [facility] & HEALTH SERVICES! [employer] introduces [employer] patient portal. Now you can access parts of your medical record, email your doctor's office, and request medication refills online. 1. In your internet browser, go to [email or web address] 2. Click on the First Time User? Click Here link in the Sign In box. You will see the New Member Sign Up page. 3. Enter your [employer] Access Code exactly as it appears below. You will not need to use this code after youve completed the sign-up process. If you do not sign up before the expiration date, you must request a new code. · [employer] Access Code: [email or web address] Expires: 4/25/2018  9:14 AM 
 
4. Enter the last four digits of your Social Security Number (xxxx) and Date of Birth (mm/dd/yyyy) as indicated and click Submit. You will be taken to the next sign-up page. 5. Create a [employer] ID. This will be your [employer] login ID and cannot be changed, so think of one that is secure and easy to remember. 6. Create a [employer] password. You can change your password at any time. 7. Enter your Password Reset Question and Answer. This can be used at a later time if you forget your password. 8. Enter your e-mail address. You will receive e-mail notification when new information is available in [address] 9. Click Sign Up. You can now view and download portions of your medical record. 10. Click the Download Summary menu link to download a portable copy of your medical information.  
 
If you have questions, please visit the Frequently Asked Questions section of the [employer]. Remember, [employer] is NOT to be used for urgent needs. For medical emergencies, dial 911. Now available from your iPhone and Android! Please provide this summary of care documentation to your next provider. Your primary care clinician is listed as Amelia Garcia. If you have any questions after today's visit, please call [phone].

## 2018-02-13 NOTE — PROGRESS NOTES
This is a Subsequent Medicare Annual Wellness Exam (AWV) (Performed 12 months after IPPE or effective date of Medicare Part B enrollment)    I have reviewed the patient's medical history in detail and updated the computerized patient record. History     Past Medical History:   Diagnosis Date    Asthma     Diabetes ([address]     Hyperlipidemia     Hypertension     Urethral trauma, sequela       Past Surgical History:   Procedure Laterality Date    HX CHOLECYSTECTOMY      HX HYSTERECTOMY       Current Outpatient Prescriptions   Medication Sig Dispense Refill    calcium carbonate (CALTREX) 600 mg calcium (1,500 mg) tablet Take 600 mg by mouth two (2) times a day.  glucose blood VI test strips (ONETOUCH ULTRA TEST) strip To check blood sugar daily 100 Strip 3    Insulin Needles, Disposable, 31 gauge x 5/16\" ndle by SubCUTAneous route daily. 1 Package 11    metFORMIN (GLUCOPHAGE) 500 mg tablet Take 1 Tab by mouth daily (with breakfast). 90 Tab 3    acetaminophen (TYLENOL) 500 mg tablet Take 1 Tab by mouth every four (4) hours as needed. Indications: Fever, Pain 20 Tab 0    budesonide-formoterol (SYMBICORT) 80-4.5 mcg/actuation HFAA Take 2 Puffs by inhalation two (2) times a day. 1 Inhaler 0    ondansetron (ZOFRAN ODT) 4 mg disintegrating tablet Take 1 Tab by mouth every eight (8) hours as needed for Nausea. 12 Tab 0    albuterol (PROVENTIL HFA, VENTOLIN HFA, PROAIR HFA) 90 mcg/actuation inhaler 2 PUFFS EVERY 4 HOURS while awake for 3 days then every 4 hours as needed for shortness of breath and/or wheezing 1 Inhaler 0    cholecalciferol (VITAMIN D3) 1,000 unit cap Take  by mouth daily.  losartan (COZAAR) 25 mg tablet Take 25 mg by mouth daily. Indications: hypertension      simvastatin (ZOCOR) 5 mg tablet Take 5 mg by mouth nightly.        Allergies   Allergen Reactions    Aspirin Anaphylaxis     Family History   Problem Relation Age of Onset    Heart Disease Mother     Heart Attack Mother  Heart Disease Father     Cancer Father     Colon Cancer Father     Diabetes Daughter      Social History   Substance Use Topics    Smoking status: Never Smoker    Smokeless tobacco: Never Used    Alcohol use No     Patient Active Problem List   Diagnosis Code    Bronchospasm J98.01    Hypoxia R09.02    Influenza A (H5N1) J09. X2    Trauma of urethra S37.30XA    Hyperlipidemia E78.5    Essential hypertension I10    Controlled type 2 diabetes mellitus without complication, without long-term current use of insulin (HCC) E11.9    Mild intermittent asthma J45.20       Depression Risk Factor Screening:     PHQ over the last two weeks 1/30/2018   Little interest or pleasure in doing things Not at all   Feeling down, depressed or hopeless Not at all   Total Score PHQ 2 0     Alcohol Risk Factor Screening: You do not drink alcohol or very rarely. Functional Ability and Level of Safety:   Hearing Loss  Hearing is good. Activities of Daily Living  The home contains: handrails  Patient does total self care    Fall Risk  Fall Risk Assessment, last 12 mths 1/30/2018   Able to walk? -   Fall in past 12 months? No       Abuse Screen  Patient is not abused    Cognitive Screening   Evaluation of Cognitive Function:  Has your family/caregiver stated any concerns about your memory: no  Normal    Patient Care Team   Patient Care Team:  Amelia Garcia MD as PCP - General (Internal Medicine)  Dom Bonilla RN as Ambulatory Care Navigator (Internal Medicine)    Assessment/Plan   Education and counseling provided:  Are appropriate based on today's review and evaluation    Diagnoses and all orders for this visit:    1. Encounter for immunization  -     TETANUS, DIPHTHERIA TOXOIDS AND ACELLULAR PERTUSSIS VACCINE (TDAP), IN INDIVIDS. >=7, IM    2.  Medicare annual wellness visit, subsequent  -     Annual  Alcohol Screen 15 min ()  -     Depression Screen Annual  -     Intense Behavioral Therapy for Obesity 15 min ()     3. Screening for alcoholism  -     Annual  Alcohol Screen 15 min ()    4. Screening for depression  -     Depression Screen Annual    5. Screen for colon cancer    6. Body mass index 30.0-30.9, adult  -     Intense Behavioral Therapy for Obesity 15 min ()     7. Controlled type 2 diabetes mellitus with complication, without long-term current use of insulin (HCC)  -     glucose blood VI test strips (ONETOUCH ULTRA TEST) strip; To check blood sugar daily  -     Insulin Needles, Disposable, 31 gauge x 5/16\" ndle; by SubCUTAneous route daily.  -     REFERRAL TO [address] Maintenance Due   Topic Date Due    EYE EXAM RETINAL OR DILATED Q1  08/03/1952    DTaP/Tdap/Td series (1 - Tdap) 08/03/1963    ZOSTER VACCINE AGE 60>  06/03/2002    GLAUCOMA SCREENING Q2Y  08/03/2007     Referred to optometry for evaluation, glaucoma screening, retinal exam  Tdap ordered today  Zoster vaccine declined    Ashok Lewis MD

## 2018-02-13 NOTE — PROGRESS NOTES
HPI     Diana Cruz is a 78 y.o. female with relevant past medical history of ashtma, DM2, HTN, hyperlipidemia, urethral trauma, recent admission for bronchitis/influenza who presents today for follow up and medicare wellness visit. Since her last visit 2 weeks ago, her breathing has improved, she has been taking symbicort only daily instead of twice daily, and tells me she has not needed to use her albuterol rescue inhaler. She declined steroids, fortunately, she has not noticed any more wheezing, cough or SOB. She denies any CP, dizziness, HA, palpitations, leg swelling, malaise, fever, chills, diaphoresis. She brings her medication bottles, and her glucometer (One Touch Ultra2) to obtain refill on her strips and lancets. She tells me she has been taking metformin once daily as recommended and has not experienced any side effects or any issues. She tells me she is planning on moving with her granddaughter and she is excited about that. When discussing advance care planning, she tells me she has the documents at home, she wishes no life support at the end of life if futile and her power of  is her granddaughter. We discuss her lab results done at her last visit, with normal lipid profile, TFTs, and unremarkable UA. Review of Systems   Constitutional: Negative for chills, diaphoresis, fever, malaise/fatigue and weight loss. HENT: Negative for congestion, hearing loss, sinus pain and sore throat. Eyes: Negative for blurred vision. Respiratory: Negative for cough, hemoptysis, sputum production, shortness of breath and wheezing. Cardiovascular: Negative for chest pain, palpitations, orthopnea, claudication, leg swelling and PND. Gastrointestinal: Negative for nausea and vomiting. Genitourinary: Negative for dysuria. Musculoskeletal: Negative for myalgias. Skin: Negative for itching and rash. Neurological: Negative for dizziness, weakness and headaches. Psychiatric/Behavioral: Negative for depression, hallucinations, substance abuse and suicidal ideas. The patient is not nervous/anxious and does not have insomnia. Past Medical History  Past Medical History:   Diagnosis Date    Asthma     Diabetes ([address]     Hyperlipidemia     Hypertension     Urethral trauma, sequela      Past Surgical History:   Procedure Laterality Date    HX CHOLECYSTECTOMY      HX HYSTERECTOMY          Family History  Family History   Problem Relation Age of Onset    Heart Disease Mother     Heart Attack Mother     Heart Disease Father     Cancer Father     Colon Cancer Father     Diabetes Daughter        Social History  She  reports that she has never smoked. She has never used smokeless tobacco.   History   Alcohol Use No       Immunization History  Immunization History   Administered Date(s) Administered    Pneumococcal Conjugate (PCV-13) 01/30/2018       Allergies  Allergies   Allergen Reactions    Aspirin Anaphylaxis       Medications  Current Outpatient Prescriptions   Medication Sig    calcium carbonate (CALTREX) 600 mg calcium (1,500 mg) tablet Take 600 mg by mouth two (2) times a day.  glucose blood VI test strips (ONETOUCH ULTRA TEST) strip To check blood sugar daily    Lancets misc To check blood sugar daily    metFORMIN (GLUCOPHAGE) 500 mg tablet Take 1 Tab by mouth daily (with breakfast).  acetaminophen (TYLENOL) 500 mg tablet Take 1 Tab by mouth every four (4) hours as needed. Indications: Fever, Pain    budesonide-formoterol (SYMBICORT) 80-4.5 mcg/actuation HFAA Take 2 Puffs by inhalation two (2) times a day.  ondansetron (ZOFRAN ODT) 4 mg disintegrating tablet Take 1 Tab by mouth every eight (8) hours as needed for Nausea.     albuterol (PROVENTIL HFA, VENTOLIN HFA, PROAIR HFA) 90 mcg/actuation inhaler 2 PUFFS EVERY 4 HOURS while awake for 3 days then every 4 hours as needed for shortness of breath and/or wheezing    cholecalciferol (VITAMIN D3) 1,000 unit cap Take  by mouth daily.  losartan (COZAAR) 25 mg tablet Take 25 mg by mouth daily. Indications: hypertension    simvastatin (ZOCOR) 5 mg tablet Take 5 mg by mouth nightly. No current facility-administered medications for this visit. Visit Vitals    /70 (BP 1 Location: Right arm, BP Patient Position: Sitting)    Pulse 65    Temp 98.4 °F (36.9 °C) (Oral)    Resp 18    Ht 4' 11\" (1.499 m)    Wt 154 lb 3.2 oz (69.9 kg)    SpO2 99%    BMI 31.14 kg/m2     Body mass index is 31.14 kg/(m^2). Physical Exam   Constitutional: She is oriented to person, place, and time and well-developed, well-nourished, and in no distress. No distress. HENT:   Head: Normocephalic and atraumatic. Nose: Nose normal.   Mouth/Throat: No oropharyngeal exudate. Eyes: Conjunctivae and EOM are normal. Pupils are equal, round, and reactive to light. Neck: Normal range of motion. Neck supple. Cardiovascular: Normal rate, regular rhythm, normal heart sounds and intact distal pulses. Pulmonary/Chest: Effort normal and breath sounds normal. No respiratory distress. She has no wheezes. She has no rales. She exhibits no tenderness. Musculoskeletal: Normal range of motion. She exhibits no edema. Neurological: She is alert and oriented to person, place, and time. Skin: Skin is warm. She is not diaphoretic.    Psychiatric: Mood, memory, affect and judgment normal.         REVIEW OF DATA    Labs  Office Visit on 01/30/2018   Component Date Value Ref Range Status    TSH 01/30/2018 1.050  0.450 - 4.500 uIU/mL Final    T4, Free 01/30/2018 1.56  0.82 - 1.77 ng/dL Final    Specific Gravity 01/30/2018 1.024  1.005 - 1.030 Final    pH (UA) 01/30/2018 5.0  5.0 - 7.5 Final    Color 01/30/2018 Yellow  Yellow Final    Appearance 01/30/2018 Turbid* Clear Final    Leukocyte Esterase 01/30/2018 Trace* Negative Final    Protein 01/30/2018 Negative  Negative/Trace Final    Glucose 01/30/2018 Negative Negative Final    Ketone 01/30/2018 Negative  Negative Final    Blood 01/30/2018 Negative  Negative Final    Bilirubin 01/30/2018 Negative  Negative Final    Urobilinogen 01/30/2018 0.2  0.2 - 1.0 mg/dL Final    Nitrites 01/30/2018 Negative  Negative Final    Microscopic Examination 01/30/2018 See additional order   Final    WBC 01/30/2018 0-5  0 - 5 /hpf Final    RBC 01/30/2018 0-2  0 - 2 /hpf Final    Epithelial cells 01/30/2018 0-10  0 - 10 /hpf Final    Casts 01/30/2018 None seen  None seen /lpf Final    Crystals 01/30/2018 Present* N/A Final    Crystal type 01/30/2018 Uric Acid  N/A Final    Mucus 01/30/2018 Present  Not Estab.  Final    Bacteria 01/30/2018 None seen  None seen/Few Final    Cholesterol, total 01/30/2018 145  100 - 199 mg/dL Final    Triglyceride 01/30/2018 123  0 - 149 mg/dL Final    HDL Cholesterol 01/30/2018 41  >39 mg/dL Final    VLDL, calculated 01/30/2018 25  5 - 40 mg/dL Final    LDL, calculated 01/30/2018 79  0 - 99 mg/dL Final    Creatinine, urine 01/30/2018 136.9  Not Estab. mg/dL Final    Microalbumin, urine 01/30/2018 4.7  Not Estab. ug/mL Final    Microalb/Creat ratio (ug/mg creat.) 01/30/2018 3.4  0.0 - 30.0 mg/g creat Final    INTERPRETATION 01/30/2018 Note   Final    PDF Image [id number] Not applicable   Final    VITAMIN D, 25-HYDROXY 01/30/2018 20.5* 30.0 - 100.0 ng/mL Final   Admission on 01/24/2018, Discharged on 01/26/2018   Component Date Value Ref Range Status    Sodium 01/24/2018 134* 136 - 145 mmol/L Final    Potassium 01/24/2018 3.6  3.5 - 5.5 mmol/L Final    Chloride 01/24/2018 95* 100 - 108 mmol/L Final    CO2 01/24/2018 26  21 - 32 mmol/L Final    Anion gap 01/24/2018 13  3.0 - 18 mmol/L Final    Glucose 01/24/2018 202* 74 - 99 mg/dL Final    BUN 01/24/2018 13  7.0 - 18 MG/DL Final    Creatinine 01/24/2018 0.99  0.6 - 1.3 MG/DL Final    BUN/Creatinine ratio 01/24/2018 13  12 - 20   Final    GFR est AA 01/24/2018 >60  >60 ml/min/1.73m2 Final    GFR est non-AA 01/24/2018 55* >60 ml/min/1.73m2 Final    Calcium 01/24/2018 9.4  8.5 - 10.1 MG/DL Final    WBC 01/24/2018 9.2  4.6 - 13.2 K/uL Final    RBC 01/24/2018 4.97  4.20 - 5.30 M/uL Final    HGB 01/24/2018 13.3  12.0 - 16.0 g/dL Final    HCT 01/24/2018 40.2  35.0 - 45.0 % Final    MCV 01/24/2018 80.9  74.0 - 97.0 FL Final    MCH 01/24/2018 26.8  24.0 - 34.0 PG Final    MCHC 01/24/2018 33.1  31.0 - 37.0 g/dL Final    RDW 01/24/2018 13.3  11.6 - 14.5 % Final    PLATELET 15/42/9184 338* 135 - 420 K/uL Final    MPV 01/24/2018 13.1* 9.2 - 11.8 FL Final    NEUTROPHILS 01/24/2018 75* 40 - 73 % Final    LYMPHOCYTES 01/24/2018 15* 21 - 52 % Final    MONOCYTES 01/24/2018 9  3 - 10 % Final    EOSINOPHILS 01/24/2018 1  0 - 5 % Final    BASOPHILS 01/24/2018 0  0 - 2 % Final    ABS. NEUTROPHILS 01/24/2018 7.0  1.8 - 8.0 K/UL Final    ABS. LYMPHOCYTES 01/24/2018 1.3  0.9 - 3.6 K/UL Final    ABS. MONOCYTES 01/24/2018 0.8  0.05 - 1.2 K/UL Final    ABS. EOSINOPHILS 01/24/2018 0.1  0.0 - 0.4 K/UL Final    ABS.  BASOPHILS 01/24/2018 0.0  0.0 - 0.06 K/UL Final    DF 01/24/2018 AUTOMATED    Final    Color 01/24/2018 DARK YELLOW    Final    Appearance 01/24/2018 CLEAR    Final    Specific gravity 01/24/2018 1.022  1.005 - 1.030   Final    pH (UA) 01/24/2018 7.5  5.0 - 8.0   Final    Protein 01/24/2018 30* NEG mg/dL Final    Glucose 01/24/2018 NEGATIVE   NEG mg/dL Final    Ketone 01/24/2018 40* NEG mg/dL Final    Bilirubin 01/24/2018 NEGATIVE   NEG   Final    Blood 01/24/2018 NEGATIVE   NEG   Final    Urobilinogen 01/24/2018 1.0  0.2 - 1.0 EU/dL Final    Nitrites 01/24/2018 NEGATIVE   NEG   Final    Leukocyte Esterase 01/24/2018 SMALL* NEG   Final    Lactic Acid (POC) 01/24/2018 2.5* 0.4 - 2.0 mmol/L Final    Influenza A Antigen 01/24/2018 POSITIVE* NEG   Final    Influenza B Antigen 01/24/2018 NEGATIVE   NEG   Final    Special Requests: 01/24/2018 NO SPECIAL REQUESTS    Final    Culture result: 01/24/2018 NO GROWTH 6 DAYS    Final    WBC 01/24/2018 0 to 3  0 - 4 /hpf Final    RBC 01/24/2018 0 to 3  0 - 5 /hpf Final    Epithelial cells 01/24/2018 4+  0 - 5 /lpf Final    Sodium 01/25/2018 136  136 - 145 mmol/L Final    Potassium 01/25/2018 3.1* 3.5 - 5.5 mmol/L Final    Chloride 01/25/2018 103  100 - 108 mmol/L Final    CO2 01/25/2018 24  21 - 32 mmol/L Final    Anion gap 01/25/2018 9  3.0 - 18 mmol/L Final    Glucose 01/25/2018 383* 74 - 99 mg/dL Final    BUN 01/25/2018 16  7.0 - 18 MG/DL Final    Creatinine 01/25/2018 0.90  0.6 - 1.3 MG/DL Final    BUN/Creatinine ratio 01/25/2018 18  12 - 20   Final    GFR est AA 01/25/2018 >60  >60 ml/min/1.73m2 Final    GFR est non-AA 01/25/2018 >60  >60 ml/min/1.73m2 Final    Calcium 01/25/2018 8.0* 8.5 - 10.1 MG/DL Final    Bilirubin, total 01/25/2018 0.5  0.2 - 1.0 MG/DL Final    ALT (SGPT) 01/25/2018 26  13 - 56 U/L Final    AST (SGOT) 01/25/2018 21  15 - 37 U/L Final    Alk.  phosphatase 01/25/2018 53  45 - 117 U/L Final    Protein, total 01/25/2018 6.7  6.4 - 8.2 g/dL Final    Albumin 01/25/2018 3.0* 3.4 - 5.0 g/dL Final    Globulin 01/25/2018 3.7  2.0 - 4.0 g/dL Final    A-G Ratio 01/25/2018 0.8  0.8 - 1.7   Final    Hemoglobin A1c 01/25/2018 7.2* 4.2 - 5.6 % Final    Est. average glucose 01/25/2018 160  mg/dL Final    Magnesium 01/25/2018 1.6  1.6 - 2.6 mg/dL Final    Phosphorus 01/25/2018 2.8  2.5 - 4.9 MG/DL Final    WBC 01/25/2018 5.6  4.6 - 13.2 K/uL Final    RBC 01/25/2018 4.10* 4.20 - 5.30 M/uL Final    HGB 01/25/2018 11.0* 12.0 - 16.0 g/dL Final    HCT 01/25/2018 33.5* 35.0 - 45.0 % Final    MCV 01/25/2018 81.7  74.0 - 97.0 FL Final    MCH 01/25/2018 26.8  24.0 - 34.0 PG Final    MCHC 01/25/2018 32.8  31.0 - 37.0 g/dL Final    RDW 01/25/2018 13.2  11.6 - 14.5 % Final    PLATELET 25/12/4044 798* 135 - 420 K/uL Final    MPV 01/25/2018 13.3* 9.2 - 11.8 FL Final    NEUTROPHILS 01/25/2018 93* 40 - 73 % Final  LYMPHOCYTES 01/25/2018 6* 21 - 52 % Final    MONOCYTES 01/25/2018 1* 3 - 10 % Final    EOSINOPHILS 01/25/2018 0  0 - 5 % Final    BASOPHILS 01/25/2018 0  0 - 2 % Final    ABS. NEUTROPHILS 01/25/2018 5.2  1.8 - 8.0 K/UL Final    ABS. LYMPHOCYTES 01/25/2018 0.4* 0.9 - 3.6 K/UL Final    ABS. MONOCYTES 01/25/2018 0.1  0.05 - 1.2 K/UL Final    ABS. EOSINOPHILS 01/25/2018 0.0  0.0 - 0.4 K/UL Final    ABS.  BASOPHILS 01/25/2018 0.0  0.0 - 0.1 K/UL Final    DF 01/25/2018 AUTOMATED    Final    Lactic acid 01/25/2018 3.0* 0.4 - 2.0 MMOL/L Final    Glucose (POC) 01/25/2018 336* 70 - 110 mg/dL Final    Glucose (POC) 01/25/2018 112* 70 - 110 mg/dL Final    Glucose (POC) 01/25/2018 70  70 - 110 mg/dL Final    Glucose (POC) 01/25/2018 104  70 - 110 mg/dL Final    Sodium 01/26/2018 143  136 - 145 mmol/L Final    Potassium 01/26/2018 3.9  3.5 - 5.5 mmol/L Final    Chloride 01/26/2018 112* 100 - 108 mmol/L Final    CO2 01/26/2018 24  21 - 32 mmol/L Final    Anion gap 01/26/2018 7  3.0 - 18 mmol/L Final    Glucose 01/26/2018 119* 74 - 99 mg/dL Final    BUN 01/26/2018 25* 7.0 - 18 MG/DL Final    Creatinine 01/26/2018 0.65  0.6 - 1.3 MG/DL Final    BUN/Creatinine ratio 01/26/2018 38* 12 - 20   Final    GFR est AA 01/26/2018 >60  >60 ml/min/1.73m2 Final    GFR est non-AA 01/26/2018 >60  >60 ml/min/1.73m2 Final    Calcium 01/26/2018 7.9* 8.5 - 10.1 MG/DL Final    Magnesium 01/26/2018 2.5  1.6 - 2.6 mg/dL Final    WBC 01/26/2018 10.0  4.6 - 13.2 K/uL Final    RBC 01/26/2018 3.98* 4.20 - 5.30 M/uL Final    HGB 01/26/2018 10.8* 12.0 - 16.0 g/dL Final    HCT 01/26/2018 32.9* 35.0 - 45.0 % Final    MCV 01/26/2018 82.7  74.0 - 97.0 FL Final    MCH 01/26/2018 27.1  24.0 - 34.0 PG Final    MCHC 01/26/2018 32.8  31.0 - 37.0 g/dL Final    RDW 01/26/2018 13.6  11.6 - 14.5 % Final    PLATELET 04/98/8225 931* 135 - 420 K/uL Final    MPV 01/26/2018 13.4* 9.2 - 11.8 FL Final    NEUTROPHILS 01/26/2018 78* 40 - 73 % Final    LYMPHOCYTES 01/26/2018 15* 21 - 52 % Final    MONOCYTES 01/26/2018 7  3 - 10 % Final    EOSINOPHILS 01/26/2018 0  0 - 5 % Final    BASOPHILS 01/26/2018 0  0 - 2 % Final    ABS. NEUTROPHILS 01/26/2018 7.8  1.8 - 8.0 K/UL Final    ABS. LYMPHOCYTES 01/26/2018 1.5  0.9 - 3.6 K/UL Final    ABS. MONOCYTES 01/26/2018 0.7  0.05 - 1.2 K/UL Final    ABS. EOSINOPHILS 01/26/2018 0.0  0.0 - 0.4 K/UL Final    ABS. BASOPHILS 01/26/2018 0.0  0.0 - 0.1 K/UL Final    DF 01/26/2018 AUTOMATED    Final    Lactic acid 01/26/2018 0.8  0.4 - 2.0 MMOL/L Final    Glucose (POC) 01/26/2018 107  70 - 110 mg/dL Final    Glucose (POC) 01/26/2018 143* 70 - 110 mg/dL Final          DIAGNOSIS AND PLAN  Patient Active Problem List   Diagnosis Code    Bronchospasm J98.01    Hypoxia R09.02    Influenza A (H5N1) J09. X2    Trauma of urethra S37.30XA    Hyperlipidemia E78.5    Essential hypertension I10    Controlled type 2 diabetes mellitus without complication, without long-term current use of insulin (HCC) E11.9    Mild intermittent asthma J45.20     1. Encounter for immunization  - TETANUS, DIPHTHERIA TOXOIDS AND ACELLULAR PERTUSSIS VACCINE (TDAP), IN INDIVIDS. >=7, IM    2. Medicare annual wellness visit, subsequent  See additional note  - Annual  Alcohol Screen 15 min () denied  - Depression Screen Annual, denied  - Intense Behavioral Therapy for Obesity 15 min () Patient motivated to work out 3 times weekly in her gym and eat healthier    3. Screening for alcoholism  - Annual  Alcohol Screen 15 min ()    4. Screening for depression  - Depression Screen Annual- neg    5. Screen for colon cancer   Per patient done within 2 years, awaiting previous PCP records. Pt. Is 77 yo and would like to stop screening    6. Body mass index 30.0-30.9, adult  - Intense Behavioral Therapy for Obesity 15 min ()     7.  Controlled type 2 diabetes mellitus with complication, without long-term current use of insulin ([facility] [address]  - C/w metformin 500 mg once dialy, we will re-check HbA1c on next encounter  - glucose blood VI test strips (ONETOUCH ULTRA TEST) strip; To check blood sugar daily  Dispense: 100 Strip; Refill: 3  - lancets ordered  - REFERRAL TO OPTOMETRY    8. Advanced Care Directives  The patient will bring documents signed. Opts for DNR in medical treatments are futile. Her power of  is her granddaughter    5. Asthma  Recent admission for bronchitis/influenza  Clinically improved  Lungs sound clear  C/w symbicort as prescribed and rescue albuterol inhaler PRN        Follow-up Disposition:  Return in about 3 months (around 5/13/2018). Cami Ludwig MD

## 2018-02-13 NOTE — PATIENT INSTRUCTIONS

## 2018-02-19 NOTE — TELEPHONE ENCOUNTER
The needles were sent in error by Dr Josh Yoo, tried to call lauren but was on hold for 17 minutes and no one answered and have 2 patients here

## 2018-02-22 DIAGNOSIS — E11.8 CONTROLLED TYPE 2 DIABETES MELLITUS WITH COMPLICATION, WITHOUT LONG-TERM CURRENT USE OF INSULIN (HCC): ICD-10-CM

## 2018-02-22 RX ORDER — LANCETS
EACH MISCELLANEOUS
Qty: 100 EACH | Refills: 3 | Status: SHIPPED | OUTPATIENT
Start: 2018-02-22 | End: 2021-11-14

## 2018-02-22 RX ORDER — INSULIN PUMP SYRINGE, 3 ML
EACH MISCELLANEOUS
Qty: 1 KIT | Refills: 0 | Status: SHIPPED | OUTPATIENT
Start: 2018-02-22 | End: 2021-11-14

## 2018-02-22 NOTE — TELEPHONE ENCOUNTER
Pt called. Said she needs new blood glucose testing machine & test strips to go with it, insurance isn't paying for her, best I could understand. It was difficult to hear her clearly. Call her with questions at [phone] anytime. Needs to go to [place]. Needs ASAP.

## 2018-02-28 ENCOUNTER — PATIENT OUTREACH (OUTPATIENT)
Dept: INTERNAL MEDICINE CLINIC | Age: 78
End: 2018-02-28

## 2018-02-28 NOTE — PROGRESS NOTES
Goals Addressed             Most Recent     COMPLETED: Prevent complications post hospitalization. On track (2/28/2018)             No admissions within 30 days post discharge, 1/26/2018  Episode closed: no hospitalization or ED admission post 30 days from discharge.

## 2018-05-19 ENCOUNTER — HOSPITAL ENCOUNTER (EMERGENCY)
Age: 78
Discharge: HOME | End: 2018-05-19
Attending: EMERGENCY MEDICINE
Payer: MEDICARE

## 2018-05-19 ENCOUNTER — APPOINTMENT (OUTPATIENT)
Dept: GENERAL RADIOLOGY | Age: 78
End: 2018-05-19
Attending: PHYSICIAN ASSISTANT
Payer: MEDICARE

## 2018-05-19 VITALS
BODY MASS INDEX: 31.85 KG/M2 | TEMPERATURE: 99.7 F | HEART RATE: 69 BPM | SYSTOLIC BLOOD PRESSURE: 177 MMHG | DIASTOLIC BLOOD PRESSURE: 79 MMHG | HEIGHT: 59 IN | OXYGEN SATURATION: 95 % | RESPIRATION RATE: 16 BRPM | WEIGHT: 158 LBS

## 2018-05-19 DIAGNOSIS — M25.562 ACUTE PAIN OF LEFT KNEE: Primary | ICD-10-CM

## 2018-05-19 DIAGNOSIS — K43.9 VENTRAL HERNIA WITHOUT OBSTRUCTION OR GANGRENE: ICD-10-CM

## 2018-05-19 PROCEDURE — 73562 X-RAY EXAM OF KNEE 3: CPT

## 2018-05-19 PROCEDURE — 99282 EMERGENCY DEPT VISIT SF MDM: CPT

## 2018-05-19 RX ORDER — DICLOFENAC SODIUM 10 MG/G
2 GEL TOPICAL 2 TIMES DAILY
Qty: 100 G | Refills: 0 | Status: SHIPPED | OUTPATIENT
Start: 2018-05-19 | End: 2021-11-14

## 2018-05-19 NOTE — DISCHARGE INSTRUCTIONS
Knee Pain or Injury: Care Instructions  Your Care Instructions    Injuries are a common cause of knee problems. Sudden (acute) injuries may be caused by a direct blow to the knee. They can also be caused by abnormal twisting, bending, or falling on the knee. Pain, bruising, or swelling may be severe, and may start within minutes of the injury. Overuse is another cause of knee pain. Other causes are climbing stairs, kneeling, and other activities that use the knee. Everyday wear and tear, especially as you get older, also can cause knee pain. Rest, along with home treatment, often relieves pain and allows your knee to heal. If you have a serious knee injury, you may need tests and treatment. Follow-up care is a key part of your treatment and safety. Be sure to make and go to all appointments, and call your doctor if you are having problems. It's also a good idea to know your test results and keep a list of the medicines you take. How can you care for yourself at home? · Be safe with medicines. Read and follow all instructions on the label. ¨ If the doctor gave you a prescription medicine for pain, take it as prescribed. ¨ If you are not taking a prescription pain medicine, ask your doctor if you can take an over-the-counter medicine. · Rest and protect your knee. Take a break from any activity that may cause pain. · Put ice or a cold pack on your knee for 10 to 20 minutes at a time. Put a thin cloth between the ice and your skin. · Prop up a sore knee on a pillow when you ice it or anytime you sit or lie down for the next 3 days. Try to keep it above the level of your heart. This will help reduce swelling. · If your knee is not swollen, you can put moist heat, a heating pad, or a warm cloth on your knee. · If your doctor recommends an elastic bandage, sleeve, or other type of support for your knee, wear it as directed.   · Follow your doctor's instructions about how much weight you can put on your leg. Use a cane, crutches, or a walker as instructed. · Follow your doctor's instructions about activity during your healing process. If you can do mild exercise, slowly increase your activity. · Reach and stay at a healthy weight. Extra weight can strain the joints, especially the knees and hips, and make the pain worse. Losing even a few pounds may help. When should you call for help? Call 911 anytime you think you may need emergency care. For example, call if:  ? · You have symptoms of a blood clot in your lung (called a pulmonary embolism). These may include:  ¨ Sudden chest pain. ¨ Trouble breathing. ¨ Coughing up blood. ?Call your doctor now or seek immediate medical care if:  ? · You have severe or increasing pain. ? · Your leg or foot turns cold or changes color. ? · You cannot stand or put weight on your knee. ? · Your knee looks twisted or bent out of shape. ? · You cannot move your knee. ? · You have signs of infection, such as:  ¨ Increased pain, swelling, warmth, or redness. ¨ Red streaks leading from the knee. ¨ Pus draining from a place on your knee. ¨ A fever. ? · You have signs of a blood clot in your leg (called a deep vein thrombosis), such as:  ¨ Pain in your calf, back of the knee, thigh, or groin. ¨ Redness and swelling in your leg or groin. ? Watch closely for changes in your health, and be sure to contact your doctor if:  ? · You have tingling, weakness, or numbness in your knee. ? · You have any new symptoms, such as swelling. ? · You have bruises from a knee injury that last longer than 2 weeks. ? · You do not get better as expected. Where can you learn more? Go to http://jacqueline-sharon.info/. Enter K195 in the search box to learn more about \"Knee Pain or Injury: Care Instructions. \"  Current as of: March 20, 2017  Content Version: 11.4  © 3861-4382 [employer].  Care instructions adapted under license by [employer] (which disclaims liability or warranty for this information). If you have questions about a medical condition or this instruction, always ask your healthcare professional. [address] any warranty or liability for your use of this information. Hernia: Care Instructions  Your Care Instructions    A hernia develops when tissue bulges through a weak spot in the wall of your belly. The groin area and the navel are common areas for a hernia. A hernia can also develop near the area of a surgery you had before. Pressure from lifting, straining, or coughing can tear the weak area, causing the hernia to bulge and be painful. If you cannot push a hernia back into place, the tissue may become trapped outside the belly wall. If the hernia gets twisted and loses its blood supply, it will swell and die. This is called a strangulated hernia. It usually causes a lot of pain. It needs treatment right away. Some hernias need to be repaired to prevent a strangulated hernia. If your hernia causes symptoms or is large, you may need surgery. Follow-up care is a key part of your treatment and safety. Be sure to make and go to all appointments, and call your doctor if you are having problems. It's also a good idea to know your test results and keep a list of the medicines you take. How can you care for yourself at home? · Take care when lifting heavy objects. · Stay at a healthy weight. · Do not smoke. Smoking can cause coughing, which can cause your hernia to bulge. If you need help quitting, talk to your doctor about stop-smoking programs and medicines. These can increase your chances of quitting for good. · Talk with your doctor before wearing a corset or truss for a hernia. These devices are not recommended for treating hernias and sometimes can do more harm than good. There may be certain situations when your doctor thinks a truss would work, but these are rare.   When should you call for help?  Call your doctor now or seek immediate medical care if:  ? · You have new or worse belly pain. ? · You are vomiting. ? · You cannot pass stools or gas. ? · You cannot push the hernia back into place with gentle pressure when you are lying down. ? · The area over the hernia turns red or becomes tender. ? Watch closely for changes in your health, and be sure to contact your doctor if you have any problems. Where can you learn more? Go to http://jacqueline-sharon.info/. Enter C129 in the search box to learn more about \"Hernia: Care Instructions. \"  Current as of: May 12, 2017  Content Version: 11.4  © 3262-8296 [employer]. Care instructions adapted under license by [employer] (which disclaims liability or warranty for this information). If you have questions about a medical condition or this instruction, always ask your healthcare professional. [address] any warranty or liability for your use of this information.

## 2018-05-19 NOTE — ED TRIAGE NOTES
Patient complains of left leg swelling and pain, patient states  It hurts to put weight on it. Patient also complains of having an abd mass and wants to have it checked out.

## 2018-05-19 NOTE — Clinical Note
As discussed, use the medication for the knee and establish with a new family doctor in [facility] who can recheck your knee and belly and may want to you start physical therapy. Resting the knee while it hurts is ok, applying ice or taking a Tylen ol may help.

## 2018-05-19 NOTE — ED PROVIDER NOTES
HPI Comments: Patient is a 78 y.o.  female with the following medical history:   Past Medical History:  No date: Asthma  No date: Diabetes ([address]  No date: Hyperlipidemia  No date: Hypertension  No date: Urethral trauma, sequela    Presents to the ED with Knee Pain and Other (abd mass). The abd mass is located just right of midline on her middle belly approx 4 finger widths below the umbilicus and above her abd scar from a previous surgery \"years ago\". It gets bigger when she tenses her stomach and smaller when she relaxes or lays down. \"I am just curious, it doesn't bother me. \"     Left knee. Denies any previous history of arthritis, gout, septic arthritis, trauma, fall, twist injury, locking or giving out of the knee. \"It started to hurt this morning like I had a kink in it and I stayed on it all day trying to get it to go away and it just got worse. It does not hurt when I am not standing on it. \"            Patient is a 78 y.o. female presenting with knee pain and other event. The history is provided by the patient. Knee Pain    This is a new problem. The current episode started 3 to 5 hours ago. The problem occurs constantly. The problem has been rapidly improving. The pain is present in the left knee. The quality of the pain is described as aching. The pain is at a severity of 1/10. The pain is mild. Pertinent negatives include no numbness, full range of motion, no stiffness, no tingling, no itching, no back pain and no neck pain. The symptoms are aggravated by standing and activity. She has tried OTC pain medications for the symptoms. The treatment provided moderate relief. There has been no history of extremity trauma. Other   This is a chronic (right mid abd mass) problem. The current episode started more than 1 week ago. The problem occurs daily. The problem has not changed since onset. Pertinent negatives include no chest pain, no abdominal pain, no headaches and no shortness of breath. Nothing aggravates the symptoms. Nothing relieves the symptoms. She has tried nothing for the symptoms. The treatment provided no relief. Past Medical History:   Diagnosis Date    Asthma     Diabetes ([address]     Hyperlipidemia     Hypertension     Urethral trauma, sequela        Past Surgical History:   Procedure Laterality Date    HX CHOLECYSTECTOMY      HX HYSTERECTOMY           Family History:   Problem Relation Age of Onset    Heart Disease Mother     Heart Attack Mother     Heart Disease Father     Cancer Father     Colon Cancer Father     Diabetes Daughter        Social History     Social History    Marital status: LEGALLY      Spouse name: N/A    Number of children: N/A    Years of education: N/A     Occupational History    unemploye      Social History Main Topics    Smoking status: Never Smoker    Smokeless tobacco: Never Used    Alcohol use No    Drug use: No    Sexual activity: No     Other Topics Concern    Not on file     Social History Narrative         ALLERGIES: Aspirin    Review of Systems   Constitutional: Positive for activity change. Negative for appetite change, chills, fatigue, fever and unexpected weight change. HENT: Negative for trouble swallowing and voice change. Eyes: Negative for discharge, redness and visual disturbance. Respiratory: Negative for cough, chest tightness and shortness of breath. Cardiovascular: Negative for chest pain and leg swelling. Gastrointestinal: Positive for abdominal distention. Negative for abdominal pain, anal bleeding, blood in stool, constipation, diarrhea, nausea, rectal pain and vomiting. Endocrine: Negative for polydipsia and polyphagia. Genitourinary: Negative for difficulty urinating, dysuria, flank pain, frequency and urgency. Musculoskeletal: Positive for arthralgias and gait problem. Negative for back pain, joint swelling, neck pain, neck stiffness and stiffness.    Skin: Negative for color change, itching, rash and wound. Allergic/Immunologic: Negative for environmental allergies and immunocompromised state. Neurological: Negative for dizziness, tingling, speech difficulty, numbness and headaches. Hematological: Negative for adenopathy. Does not bruise/bleed easily. Psychiatric/Behavioral: Negative for agitation, behavioral problems, confusion and decreased concentration. The patient is not nervous/anxious. Vitals:    05/19/18 1632   BP: 177/79   Pulse: 69   Resp: 16   Temp: 99.7 °F (37.6 °C)   SpO2: 95%   Weight: 71.7 kg (158 lb)   Height: 4' 11\" (1.499 m)            Physical Exam   Constitutional: She is oriented to person, place, and time. She appears well-developed and well-nourished. No distress. HENT:   Head: Normocephalic. Eyes: Conjunctivae are normal. No scleral icterus. Neck: No JVD present. Cardiovascular: Normal rate, regular rhythm and normal heart sounds. No murmur heard. Pulmonary/Chest: Effort normal and breath sounds normal. No stridor. No respiratory distress. Abdominal: Soft. Bowel sounds are normal. She exhibits distension. There is no hepatosplenomegaly. There is no tenderness. There is no rebound, no guarding and no CVA tenderness. A hernia is present. Hernia confirmed positive in the ventral area. Musculoskeletal: Normal range of motion. She exhibits edema and tenderness. She exhibits no deformity. Left hip: Normal.        Left knee: She exhibits effusion. She exhibits normal range of motion, no ecchymosis, no deformity, no laceration, no erythema, normal alignment, no LCL laxity, normal patellar mobility, normal meniscus and no MCL laxity. Tenderness found. Patellar tendon tenderness noted. Left ankle: She exhibits swelling. She exhibits normal range of motion, no ecchymosis, no deformity, no laceration and normal pulse. No tenderness. No lateral malleolus and no medial malleolus tenderness found.  Achilles tendon normal.   Neurological: She is alert and oriented to person, place, and time. Skin: Skin is warm and dry. Psychiatric: She has a normal mood and affect. Her behavior is normal. Judgment and thought content normal.        MDM  Number of Diagnoses or Management Options  Acute pain of left knee:   Ventral hernia without obstruction or gangrene:   Diagnosis management comments: Ventral hernia was discussed and not an urgent issue and asked her to discuss with her new provider after she moves near [facility] in a couple weeks if it starts to bother her. \"No it has been there 10 years and I don't have any issues, just curious. \"    Radiograph of left knee has medial lipping of the bones that appears to be a chronic tendon irritation or cartilage DJD, over all the knee looks well for 78 y.o. The primary encounter diagnosis was Acute pain of left knee. A diagnosis of Ventral hernia without obstruction or gangrene was also pertinent to this visit.             ED Course       Procedures

## 2018-05-21 ENCOUNTER — PATIENT OUTREACH (OUTPATIENT)
Dept: INTERNAL MEDICINE CLINIC | Age: 78
End: 2018-05-21

## 2018-05-21 NOTE — PROGRESS NOTES
ED Discharge Follow-Up    Date/Time:  2018 12:08 PM    Patient was admitted to [facility] ED on 2018 and discharged on 2018 for acute pain of the left knee and Ventral hernia without obstruction or gangrene . Presenting symptoms: left leg pain and swelling, and abdominal mass    Nurse Navigator attempted to contact both the patient and her granddaughter (whose the patients Medical POA) post discharge. Left message for the patient to contact the office on the answering machine. Mrs. Murillo, returned Nurse Navigator's call (Family)    Nurse Navigator(NN) contacted the family  by telephone to perform post ED discharge assessment. Verified name and  with patient as identifiers. Provided introduction to self, and explanation of the Nurse Navigator role. Patient contacted within 1 business day(s) of discharge. Mrs. Trev Sanchez reported the following:  She checks on the patient daily  She is currently in [place] for work  Last spoke with the patient today and she is keeping the left elevated  Will be back in town 2018 and will evaluate patient's condition and if an appointment is needed will contact the office    Medication:   New medications at discharge include:  Voltaren    Offered follow up appointment with PCP: yes     No future appointments. Goals        Post ED     Reduce ED Utilization            No admissions within 30 days post discharge, 2018         Post Hospitalization     Attends follow-up appointments as directed.             Follow up with PCP

## 2018-06-21 ENCOUNTER — PATIENT OUTREACH (OUTPATIENT)
Dept: INTERNAL MEDICINE CLINIC | Age: 78
End: 2018-06-21

## 2018-06-21 NOTE — PROGRESS NOTES
Goals Addressed             Most Recent       Post ED     COMPLETED: Reduce ED Utilization   On track (6/21/2018)             No admissions within 30 days post discharge, 5/19/2018  Episode closed: no hospitalization or ED admission post 30 days from discharge.

## 2021-11-14 ENCOUNTER — APPOINTMENT (OUTPATIENT)
Dept: CT IMAGING | Age: 81
End: 2021-11-14
Attending: STUDENT IN AN ORGANIZED HEALTH CARE EDUCATION/TRAINING PROGRAM
Payer: MEDICARE

## 2021-11-14 ENCOUNTER — HOSPITAL ENCOUNTER (EMERGENCY)
Age: 81
Discharge: HOME | End: 2021-11-14
Attending: STUDENT IN AN ORGANIZED HEALTH CARE EDUCATION/TRAINING PROGRAM
Payer: MEDICARE

## 2021-11-14 VITALS
RESPIRATION RATE: 16 BRPM | BODY MASS INDEX: 26.41 KG/M2 | HEART RATE: 60 BPM | HEIGHT: 59 IN | DIASTOLIC BLOOD PRESSURE: 65 MMHG | WEIGHT: 131 LBS | TEMPERATURE: 97.5 F | OXYGEN SATURATION: 99 % | SYSTOLIC BLOOD PRESSURE: 148 MMHG

## 2021-11-14 DIAGNOSIS — R73.9 HYPERGLYCEMIA: ICD-10-CM

## 2021-11-14 DIAGNOSIS — R51.9 NONINTRACTABLE HEADACHE, UNSPECIFIED CHRONICITY PATTERN, UNSPECIFIED HEADACHE TYPE: ICD-10-CM

## 2021-11-14 DIAGNOSIS — N39.0 URINARY TRACT INFECTION WITHOUT HEMATURIA, SITE UNSPECIFIED: Primary | ICD-10-CM

## 2021-11-14 LAB
ANION GAP SERPL CALC-SCNC: 5 MMOL/L (ref 3–18)
APPEARANCE UR: ABNORMAL
ATRIAL RATE: 58 BPM
BACTERIA URNS QL MICRO: ABNORMAL /HPF
BASOPHILS # BLD: 0.1 K/UL (ref 0–0.1)
BASOPHILS NFR BLD: 1 % (ref 0–2)
BILIRUB UR QL: NEGATIVE
BUN SERPL-MCNC: 28 MG/DL (ref 7–18)
BUN/CREAT SERPL: 35 (ref 12–20)
CALCIUM SERPL-MCNC: 9.6 MG/DL (ref 8.5–10.1)
CALCULATED P AXIS, ECG09: 63 DEGREES
CALCULATED R AXIS, ECG10: 36 DEGREES
CALCULATED T AXIS, ECG11: 16 DEGREES
CHLORIDE SERPL-SCNC: 101 MMOL/L (ref 100–111)
CO2 SERPL-SCNC: 31 MMOL/L (ref 21–32)
COLOR UR: YELLOW
CREAT SERPL-MCNC: 0.81 MG/DL (ref 0.6–1.3)
DIAGNOSIS, 93000: NORMAL
DIFFERENTIAL METHOD BLD: ABNORMAL
EOSINOPHIL # BLD: 0.3 K/UL (ref 0–0.4)
EOSINOPHIL NFR BLD: 3 % (ref 0–5)
EPITH CASTS URNS QL MICRO: ABNORMAL /LPF (ref 0–5)
ERYTHROCYTE [DISTWIDTH] IN BLOOD BY AUTOMATED COUNT: 12.3 % (ref 11.6–14.5)
GLUCOSE BLD STRIP.AUTO-MCNC: 319 MG/DL (ref 70–110)
GLUCOSE SERPL-MCNC: 313 MG/DL (ref 74–99)
GLUCOSE UR STRIP.AUTO-MCNC: >1000 MG/DL
HCT VFR BLD AUTO: 42 % (ref 35–45)
HGB BLD-MCNC: 13.8 G/DL (ref 12–16)
HGB UR QL STRIP: NEGATIVE
IMM GRANULOCYTES # BLD AUTO: 0 K/UL (ref 0–0.04)
IMM GRANULOCYTES NFR BLD AUTO: 0 % (ref 0–0.5)
KETONES UR QL STRIP.AUTO: 40 MG/DL
LEUKOCYTE ESTERASE UR QL STRIP.AUTO: ABNORMAL
LYMPHOCYTES # BLD: 2.9 K/UL (ref 0.9–3.6)
LYMPHOCYTES NFR BLD: 33 % (ref 21–52)
MCH RBC QN AUTO: 27.1 PG (ref 24–34)
MCHC RBC AUTO-ENTMCNC: 32.9 G/DL (ref 31–37)
MCV RBC AUTO: 82.4 FL (ref 78–100)
MONOCYTES # BLD: 0.6 K/UL (ref 0.05–1.2)
MONOCYTES NFR BLD: 7 % (ref 3–10)
NEUTS SEG # BLD: 5 K/UL (ref 1.8–8)
NEUTS SEG NFR BLD: 56 % (ref 40–73)
NITRITE UR QL STRIP.AUTO: NEGATIVE
NRBC # BLD: 0 K/UL (ref 0–0.01)
NRBC BLD-RTO: 0 PER 100 WBC
P-R INTERVAL, ECG05: 154 MS
PH UR STRIP: 5.5 [PH] (ref 5–8)
PLATELET # BLD AUTO: 180 K/UL (ref 135–420)
PLATELET COMMENTS,PCOM: ABNORMAL
PMV BLD AUTO: 13.6 FL (ref 9.2–11.8)
POTASSIUM SERPL-SCNC: 3.5 MMOL/L (ref 3.5–5.5)
PROT UR STRIP-MCNC: ABNORMAL MG/DL
Q-T INTERVAL, ECG07: 448 MS
QRS DURATION, ECG06: 82 MS
QTC CALCULATION (BEZET), ECG08: 439 MS
RBC # BLD AUTO: 5.1 M/UL (ref 4.2–5.3)
RBC #/AREA URNS HPF: ABNORMAL /HPF (ref 0–5)
RBC MORPH BLD: ABNORMAL
SODIUM SERPL-SCNC: 137 MMOL/L (ref 136–145)
SP GR UR REFRACTOMETRY: >1.03 (ref 1–1.03)
UROBILINOGEN UR QL STRIP.AUTO: 1 EU/DL (ref 0.2–1)
VENTRICULAR RATE, ECG03: 58 BPM
WBC # BLD AUTO: 8.9 K/UL (ref 4.6–13.2)
WBC URNS QL MICRO: ABNORMAL /HPF (ref 0–4)

## 2021-11-14 PROCEDURE — 93005 ELECTROCARDIOGRAM TRACING: CPT

## 2021-11-14 PROCEDURE — 74011250636 HC RX REV CODE- 250/636: Performed by: STUDENT IN AN ORGANIZED HEALTH CARE EDUCATION/TRAINING PROGRAM

## 2021-11-14 PROCEDURE — 80048 BASIC METABOLIC PNL TOTAL CA: CPT

## 2021-11-14 PROCEDURE — 70450 CT HEAD/BRAIN W/O DYE: CPT

## 2021-11-14 PROCEDURE — 85025 COMPLETE CBC W/AUTO DIFF WBC: CPT

## 2021-11-14 PROCEDURE — 96360 HYDRATION IV INFUSION INIT: CPT

## 2021-11-14 PROCEDURE — 82962 GLUCOSE BLOOD TEST: CPT

## 2021-11-14 PROCEDURE — 99284 EMERGENCY DEPT VISIT MOD MDM: CPT

## 2021-11-14 PROCEDURE — 81001 URINALYSIS AUTO W/SCOPE: CPT

## 2021-11-14 RX ORDER — CEPHALEXIN 500 MG/1
500 CAPSULE ORAL 4 TIMES DAILY
Qty: 28 CAPSULE | Refills: 0 | Status: SHIPPED | OUTPATIENT
Start: 2021-11-14 | End: 2021-11-21

## 2021-11-14 RX ORDER — CEPHALEXIN 500 MG/1
500 CAPSULE ORAL 4 TIMES DAILY
Qty: 28 CAPSULE | Refills: 0 | Status: SHIPPED | OUTPATIENT
Start: 2021-11-14 | End: 2021-11-14 | Stop reason: SDUPTHER

## 2021-11-14 RX ADMIN — SODIUM CHLORIDE 1000 ML: 900 INJECTION, SOLUTION INTRAVENOUS at 10:00

## 2021-11-14 NOTE — ED TRIAGE NOTES
Patient states that she has hx of diabetes for which she, in the past, took Metformin. She states that she does not take any medications for diabetes since \"diabetes went away went I took Metformin twenty years ago\". She c/o headache since Thursday. She states that when her blood sugar is high, she gets a headache. Patient's daughter, Aide Morejon, states that patient was acting confused on Thursday.

## 2021-11-14 NOTE — ED PROVIDER NOTES
61-year-old female with history of diabetes, hypertension, hyperlipidemia, asthma presenting to the emergency department for evaluation of a headache for the past 3 days. Patient does have some mild dementia and history is provided by both her and daughter. Patient reports having a left-sided frontal headache has been ongoing for the past 3 days. It is throbbing without radiation. She has no vision changes, nausea or vomiting. She reports that she has these headaches intermittently when her blood sugar is high. Patient reports that she has not taken any medications for any of her diagnoses as her primary care doctor told her that she can discontinue them. She has not been taking any medications for over a year. Daughter reports that she seemed a little confused on Thursday and more uncoordinated then usual.  There were no falls or head trauma. Since Thursday, however, patient seems to be at her baseline other than for the mild headache. Denies fevers or chills, chest pain, shortness of breath, abdominal pain, numbness or tingling, or weakness in her extremities.            Past Medical History:   Diagnosis Date    Asthma     [address] [address]     Hyperlipidemia     Hypertension     Urethral trauma, sequela        Past Surgical History:   Procedure Laterality Date    HX CHOLECYSTECTOMY      HX HYSTERECTOMY           Family History:   Problem Relation Age of Onset    Heart Disease Mother     Heart Attack Mother     Heart Disease Father     Cancer Father     Colon Cancer Father     Diabetes Daughter        Social History     Socioeconomic History    Marital status: LEGALLY      Spouse name: Not on file    Number of children: Not on file    Years of education: Not on file    Highest education level: Not on file   Occupational History    Occupation: unemploye   Tobacco Use    Smoking status: Never Smoker    Smokeless tobacco: Never Used   Substance and Sexual Activity    Alcohol use: No    Drug use: No    Sexual activity: Never   Other Topics Concern    Not on file   Social History Narrative    Not on file     Social Determinants of Health     Financial Resource Strain:     Difficulty of Paying Living Expenses: Not on file   Food Insecurity:     Worried About Running Out of Food in the Last Year: Not on file    Janes of Food in the Last Year: Not on file   Transportation Needs:     Lack of Transportation (Medical): Not on file    Lack of Transportation (Non-Medical): Not on file   Physical Activity:     Days of Exercise per Week: Not on file    Minutes of Exercise per Session: Not on file   Stress:     Feeling of Stress : Not on file   Social Connections:     Frequency of Communication with Friends and Family: Not on file    Frequency of Social Gatherings with Friends and Family: Not on file    Attends Restorationism Services: Not on file    Active Member of [address] [place] [address] or Organizations: Not on file    Attends Club or Organization Meetings: Not on file    Marital Status: Not on file   Intimate Partner Violence:     Fear of Current or Ex-Partner: Not on file    Emotionally Abused: Not on file    Physically Abused: Not on file    Sexually Abused: Not on file   Housing Stability:     Unable to Pay for Housing in the Last Year: Not on file    Number of [place] in the Last Year: Not on file    Unstable Housing in the Last Year: Not on file         ALLERGIES: Aspirin and Yellow dye    Review of Systems   Constitutional: Negative for activity change, chills, diaphoresis, fatigue and fever. Eyes: Negative for photophobia, pain and visual disturbance. Respiratory: Negative for cough, chest tightness, shortness of breath, wheezing and stridor. Cardiovascular: Negative for chest pain and palpitations. Gastrointestinal: Negative for abdominal distention, abdominal pain, constipation, diarrhea, nausea and vomiting.    Genitourinary: Negative for difficulty urinating, dysuria and hematuria. Musculoskeletal: Negative for back pain, joint swelling and myalgias. Skin: Negative for rash and wound. Neurological: Positive for headaches. Negative for dizziness, tremors, seizures, syncope, speech difficulty, weakness, light-headedness and numbness. Psychiatric/Behavioral: Negative for agitation. The patient is not nervous/anxious. Vitals:    11/14/21 0939   BP: (!) 148/65   Pulse: 60   Resp: 16   Temp: 97.5 °F (36.4 °C)   SpO2: 99%   Weight: 59.4 kg (131 lb)   Height: 4' 11\" (1.499 m)            Physical Exam  Constitutional:       General: She is not in acute distress. Appearance: She is not toxic-appearing. HENT:      Head: Normocephalic and atraumatic. Mouth/Throat:      Mouth: Mucous membranes are moist.   Eyes:      Extraocular Movements: Extraocular movements intact. Conjunctiva/sclera: Conjunctivae normal.      Pupils: Pupils are equal, round, and reactive to light. Cardiovascular:      Rate and Rhythm: Normal rate. Pulses: Normal pulses. Heart sounds: Normal heart sounds. Pulmonary:      Effort: Pulmonary effort is normal.      Breath sounds: Normal breath sounds. Abdominal:      General: Abdomen is flat. Musculoskeletal:         General: No swelling, tenderness or deformity. Normal range of motion. Cervical back: Normal range of motion and neck supple. Skin:     General: Skin is warm and dry. Capillary Refill: Capillary refill takes less than 2 seconds. Neurological:      General: No focal deficit present. Mental Status: She is alert and oriented to person, place, and time. Mental status is at baseline. Cranial Nerves: No cranial nerve deficit. Sensory: No sensory deficit. Motor: No weakness.    Psychiatric:         Mood and Affect: Mood normal.          MDM  Number of Diagnoses or Management Options  Hyperglycemia  Nonintractable headache, unspecified chronicity pattern, unspecified headache type  Urinary tract infection without hematuria, site unspecified  Diagnosis management comments: 62-year-old female presenting for evaluation of headache and hyperglycemia. Blood sugar is now in triage over 300. Her neurological exam is otherwise nonfocal.  Vital signs stable. She is afebrile. Will obtain CT head. Will obtain screening blood work to evaluate for possible DKA. She will be given IV fluids for her hyperglycemia. Also obtain EKG. 1145:  Patient's blood work suggestive of urinary tract infection. Will be discharged with antibiotic prescription. CT head is negative. Blood work otherwise unremarkable other than for hyperglycemia. I do not believe that this needs to be treated at this time as the patient is otherwise asymptomatic at this time patient has been instructed to follow-up with primary care doctor. ED return precautions discussed.

## 2021-12-29 ENCOUNTER — HOSPITAL ENCOUNTER (EMERGENCY)
Age: 81
Discharge: HOME | End: 2021-12-29
Attending: STUDENT IN AN ORGANIZED HEALTH CARE EDUCATION/TRAINING PROGRAM
Payer: MEDICARE

## 2021-12-29 ENCOUNTER — APPOINTMENT (OUTPATIENT)
Dept: GENERAL RADIOLOGY | Age: 81
End: 2021-12-29
Attending: STUDENT IN AN ORGANIZED HEALTH CARE EDUCATION/TRAINING PROGRAM
Payer: MEDICARE

## 2021-12-29 VITALS
RESPIRATION RATE: 18 BRPM | WEIGHT: 145 LBS | DIASTOLIC BLOOD PRESSURE: 73 MMHG | OXYGEN SATURATION: 98 % | BODY MASS INDEX: 29.23 KG/M2 | SYSTOLIC BLOOD PRESSURE: 144 MMHG | TEMPERATURE: 99.8 F | HEART RATE: 92 BPM | HEIGHT: 59 IN

## 2021-12-29 DIAGNOSIS — R06.02 SHORTNESS OF BREATH: ICD-10-CM

## 2021-12-29 DIAGNOSIS — J41.0 SIMPLE CHRONIC BRONCHITIS (HCC): ICD-10-CM

## 2021-12-29 DIAGNOSIS — R06.01 ORTHOPNEA: Primary | ICD-10-CM

## 2021-12-29 LAB
ANION GAP SERPL CALC-SCNC: 12 MMOL/L (ref 3–18)
ATRIAL RATE: 97 BPM
BASOPHILS # BLD: 0.1 K/UL (ref 0–0.1)
BASOPHILS NFR BLD: 0 % (ref 0–2)
BNP SERPL-MCNC: 228 PG/ML (ref 0–1800)
BUN SERPL-MCNC: 15 MG/DL (ref 7–18)
BUN/CREAT SERPL: 25 (ref 12–20)
CALCIUM SERPL-MCNC: 9.6 MG/DL (ref 8.5–10.1)
CALCULATED P AXIS, ECG09: 48 DEGREES
CALCULATED R AXIS, ECG10: 24 DEGREES
CALCULATED T AXIS, ECG11: 25 DEGREES
CHLORIDE SERPL-SCNC: 99 MMOL/L (ref 100–111)
CO2 SERPL-SCNC: 23 MMOL/L (ref 21–32)
CREAT SERPL-MCNC: 0.6 MG/DL (ref 0.6–1.3)
DIAGNOSIS, 93000: NORMAL
DIFFERENTIAL METHOD BLD: ABNORMAL
EOSINOPHIL # BLD: 0.4 K/UL (ref 0–0.4)
EOSINOPHIL NFR BLD: 2 % (ref 0–5)
ERYTHROCYTE [DISTWIDTH] IN BLOOD BY AUTOMATED COUNT: 12.9 % (ref 11.6–14.5)
GLUCOSE SERPL-MCNC: 239 MG/DL (ref 74–99)
HCT VFR BLD AUTO: 37.6 % (ref 35–45)
HGB BLD-MCNC: 12.5 G/DL (ref 12–16)
IMM GRANULOCYTES # BLD AUTO: 0.1 K/UL (ref 0–0.04)
IMM GRANULOCYTES NFR BLD AUTO: 1 % (ref 0–0.5)
LYMPHOCYTES # BLD: 2.1 K/UL (ref 0.9–3.6)
LYMPHOCYTES NFR BLD: 12 % (ref 21–52)
MCH RBC QN AUTO: 27.1 PG (ref 24–34)
MCHC RBC AUTO-ENTMCNC: 33.2 G/DL (ref 31–37)
MCV RBC AUTO: 81.4 FL (ref 78–100)
MONOCYTES # BLD: 0.8 K/UL (ref 0.05–1.2)
MONOCYTES NFR BLD: 5 % (ref 3–10)
NEUTS SEG # BLD: 13.9 K/UL (ref 1.8–8)
NEUTS SEG NFR BLD: 80 % (ref 40–73)
NRBC # BLD: 0 K/UL (ref 0–0.01)
NRBC BLD-RTO: 0 PER 100 WBC
P-R INTERVAL, ECG05: 144 MS
PLATELET # BLD AUTO: 344 K/UL (ref 135–420)
PMV BLD AUTO: 11.2 FL (ref 9.2–11.8)
POTASSIUM SERPL-SCNC: 3.8 MMOL/L (ref 3.5–5.5)
Q-T INTERVAL, ECG07: 340 MS
QRS DURATION, ECG06: 84 MS
QTC CALCULATION (BEZET), ECG08: 431 MS
RBC # BLD AUTO: 4.62 M/UL (ref 4.2–5.3)
SODIUM SERPL-SCNC: 134 MMOL/L (ref 136–145)
TROPONIN-HIGH SENSITIVITY: 7 NG/L (ref 0–54)
VENTRICULAR RATE, ECG03: 97 BPM
WBC # BLD AUTO: 17.3 K/UL (ref 4.6–13.2)

## 2021-12-29 PROCEDURE — 71046 X-RAY EXAM CHEST 2 VIEWS: CPT

## 2021-12-29 PROCEDURE — 84484 ASSAY OF TROPONIN QUANT: CPT

## 2021-12-29 PROCEDURE — 83880 ASSAY OF NATRIURETIC PEPTIDE: CPT

## 2021-12-29 PROCEDURE — 85025 COMPLETE CBC W/AUTO DIFF WBC: CPT

## 2021-12-29 PROCEDURE — 80048 BASIC METABOLIC PNL TOTAL CA: CPT

## 2021-12-29 PROCEDURE — 99283 EMERGENCY DEPT VISIT LOW MDM: CPT

## 2021-12-29 PROCEDURE — 93005 ELECTROCARDIOGRAM TRACING: CPT

## 2021-12-29 RX ORDER — DOXYCYCLINE HYCLATE 100 MG
100 TABLET ORAL 2 TIMES DAILY
Qty: 14 TABLET | Refills: 0 | Status: SHIPPED | OUTPATIENT
Start: 2021-12-29 | End: 2022-01-05

## 2021-12-29 NOTE — ED PROVIDER NOTES
EMERGENCY DEPARTMENT HISTORY AND PHYSICAL EXAM      Date: 12/29/2021  Patient Name: Sabina Strange    History of Presenting Illness     Chief Complaint   Patient presents with    Shortness of Breath       History (Context): Sabina Strange is a 80 y.o. female with a past medical history significant for asthma, diabetes, hypertension, hyperlipidemia comes into the ED today due to dyspnea. Patient states symptoms began over the past few days. She states symptoms have worsened since onset. She states dyspnea is exacerbated with laying down flat and mildly alleviated while sitting up. She states that she is never had these symptoms previously and denies orthopnea or PND. She also denies previous cardiac or renal abnormalities. She denies taking any medication for treatment of her symptoms prior to arrival.  She describes her symptoms as moderate in severity. She otherwise states she is been feeling healthy without any fever, chills, chest pain, cough, abdominal pain, nausea, vomiting, or diarrhea. PCP: Shubham Anthony MD    Current Outpatient Medications   Medication Sig Dispense Refill    doxycycline (VIBRA-TABS) 100 mg tablet Take 1 Tablet by mouth two (2) times a day for 7 days.  14 Tablet 0       Past History     Past Medical History:   Past Medical History:   Diagnosis Date    Asthma     Diabetes ([address]     Hyperlipidemia     Hypertension     Urethral trauma, sequela        Past Surgical History:  Past Surgical History:   Procedure Laterality Date    HX CHOLECYSTECTOMY      HX HYSTERECTOMY         Family History:  Family History   Problem Relation Age of Onset    Heart Disease Mother     Heart Attack Mother     Heart Disease Father     Cancer Father     Colon Cancer Father     Diabetes Daughter        Social History:   Social History     Tobacco Use    Smoking status: Never Smoker    Smokeless tobacco: Never Used   Substance Use Topics    Alcohol use: No    Drug use: No       Allergies: Allergies   Allergen Reactions    Aspirin Anaphylaxis    Yellow Dye Unknown (comments)       PMH, PSH, family history, social history, allergies reviewed with the patient with significant items noted above. Review of Systems   Review of Systems   Constitutional: Negative for chills and fever. HENT: Negative for sore throat. Eyes: Negative for visual disturbance. Respiratory: Positive for shortness of breath. Negative for cough. Cardiovascular: Negative for chest pain. Gastrointestinal: Negative for abdominal pain, nausea and vomiting. Genitourinary: Negative for difficulty urinating. Musculoskeletal: Negative for myalgias. Skin: Negative for rash. Neurological: Negative for headaches. Physical Exam     Vitals:    12/29/21 1102   BP: (!) 144/73   Pulse: 92   Resp: 18   Temp: 99.8 °F (37.7 °C)   SpO2: 98%   Weight: 65.8 kg (145 lb)   Height: 4' 11\" (1.499 m)       Physical Exam  Vitals and nursing note reviewed. Constitutional:       General: She is not in acute distress. Appearance: Normal appearance. She is not ill-appearing or toxic-appearing. HENT:      Head: Normocephalic and atraumatic. Mouth/Throat:      Mouth: Mucous membranes are moist.   Eyes:      General: No scleral icterus. Conjunctiva/sclera: Conjunctivae normal.   Cardiovascular:      Rate and Rhythm: Normal rate and regular rhythm. Comments: Normal peripheral perfusion  Pulmonary:      Effort: Pulmonary effort is normal. No respiratory distress. Abdominal:      General: There is no distension. Palpations: Abdomen is soft. Tenderness: There is no abdominal tenderness. Musculoskeletal:         General: No deformity. Normal range of motion. Cervical back: Normal range of motion and neck supple. Right lower leg: No edema. Left lower leg: No edema. Skin:     General: Skin is warm and dry. Findings: No rash.    Neurological:      General: No focal deficit present. Mental Status: She is alert and oriented to person, place, and time. Mental status is at baseline. Psychiatric:         Mood and Affect: Mood normal.         Thought Content: Thought content normal.         Diagnostic Study Results     Labs -     Recent Results (from the past 12 hour(s))   CBC WITH AUTOMATED DIFF    Collection Time: 12/29/21 11:20 AM   Result Value Ref Range    WBC 17.3 (H) 4.6 - 13.2 K/uL    RBC 4.62 4.20 - 5.30 M/uL    HGB 12.5 12.0 - 16.0 g/dL    HCT 37.6 35.0 - 45.0 %    MCV 81.4 78.0 - 100.0 FL    MCH 27.1 24.0 - 34.0 PG    MCHC 33.2 31.0 - 37.0 g/dL    RDW 12.9 11.6 - 14.5 %    PLATELET 397 658 - 866 K/uL    MPV 11.2 9.2 - 11.8 FL    NRBC 0.0 0  WBC    ABSOLUTE NRBC 0.00 0.00 - 0.01 K/uL    NEUTROPHILS 80 (H) 40 - 73 %    LYMPHOCYTES 12 (L) 21 - 52 %    MONOCYTES 5 3 - 10 %    EOSINOPHILS 2 0 - 5 %    BASOPHILS 0 0 - 2 %    IMMATURE GRANULOCYTES 1 (H) 0.0 - 0.5 %    ABS. NEUTROPHILS 13.9 (H) 1.8 - 8.0 K/UL    ABS. LYMPHOCYTES 2.1 0.9 - 3.6 K/UL    ABS. MONOCYTES 0.8 0.05 - 1.2 K/UL    ABS. EOSINOPHILS 0.4 0.0 - 0.4 K/UL    ABS. BASOPHILS 0.1 0.0 - 0.1 K/UL    ABS. IMM.  GRANS. 0.1 (H) 0.00 - 0.04 K/UL    DF AUTOMATED     METABOLIC PANEL, BASIC    Collection Time: 12/29/21 11:20 AM   Result Value Ref Range    Sodium 134 (L) 136 - 145 mmol/L    Potassium 3.8 3.5 - 5.5 mmol/L    Chloride 99 (L) 100 - 111 mmol/L    CO2 23 21 - 32 mmol/L    Anion gap 12 3.0 - 18 mmol/L    Glucose 239 (H) 74 - 99 mg/dL    BUN 15 7.0 - 18 MG/DL    Creatinine 0.60 0.6 - 1.3 MG/DL    BUN/Creatinine ratio 25 (H) 12 - 20      GFR est AA >60 >60 ml/min/1.73m2    GFR est non-AA >60 >60 ml/min/1.73m2    Calcium 9.6 8.5 - 10.1 MG/DL   TROPONIN-HIGH SENSITIVITY    Collection Time: 12/29/21 11:20 AM   Result Value Ref Range    Troponin-High Sensitivity 7 0 - 54 ng/L   NT-PRO BNP    Collection Time: 12/29/21 11:20 AM   Result Value Ref Range    NT pro- 0 - 1,800 PG/ML   EKG, 12 LEAD, INITIAL Collection Time: 12/29/21 11:23 AM   Result Value Ref Range    Ventricular Rate 97 BPM    Atrial Rate 97 BPM    P-R Interval 144 ms    QRS Duration 84 ms    Q-T Interval 340 ms    QTC Calculation (Bezet) 431 ms    Calculated P Axis 48 degrees    Calculated R Axis 24 degrees    Calculated T Axis 25 degrees    Diagnosis       Normal sinus rhythm  Normal ECG  When compared with ECG of 14-NOV-2021 11:07,  Vent. rate has increased BY  39 BPM  Criteria for Septal infarct are no longer present  Nonspecific T wave abnormality no longer evident in Anterior leads  Confirmed by Pratik Higginbotham MD, Jes Hogue ([id number]) on 12/29/2021 1:01:10 PM        Labs Reviewed   CBC WITH AUTOMATED DIFF - Abnormal; Notable for the following components:       Result Value    WBC 17.3 (*)     NEUTROPHILS 80 (*)     LYMPHOCYTES 12 (*)     IMMATURE GRANULOCYTES 1 (*)     ABS. NEUTROPHILS 13.9 (*)     ABS. IMM. GRANS. 0.1 (*)     All other components within normal limits   METABOLIC PANEL, BASIC - Abnormal; Notable for the following components:    Sodium 134 (*)     Chloride 99 (*)     Glucose 239 (*)     BUN/Creatinine ratio 25 (*)     All other components within normal limits   TROPONIN-HIGH SENSITIVITY   NT-PRO BNP       Radiologic Studies -   XR CHEST PA LAT   Final Result   1. Changes suggestive of COPD with mild chronic interstitial lung changes. No   acute infiltrate or effusion appreciated. CT Results  (Last 48 hours)    None        CXR Results  (Last 48 hours)               12/29/21 1132  XR CHEST PA LAT Final result    Impression:  1. Changes suggestive of COPD with mild chronic interstitial lung changes. No   acute infiltrate or effusion appreciated. Narrative:  EXAM: Chest Radiographs       INDICATION:  sob       TECHNIQUE: PA and lateral views of the chest       COMPARISON: 1/24/2018       FINDINGS: No pneumothorax identified. Mild interstitial opacities are noted   which are unchanged from prior.  The lungs are hyperinflated with increased AP   diameter. This is concerning for COPD. No consolidation identified No effusions   appreciated. The cardiomediastinal silhouette is unremarkable. The pulmonary   vascularity is unremarkable. The osseous structures are unremarkable. The laboratory results, imaging results, and other diagnostic exams were reviewed in the EMR. Medical Decision Making   I am the first provider for this patient. I reviewed the vital signs, available nursing notes, past medical history, past surgical history, family history and social history. Vital Signs-Reviewed the patient's vital signs. ED EKG interpretation:  Rhythm: normal sinus rhythm; and Regular. Rate (approx.): 98; Axis: ; P wave: normal; QRS interval: normal ; ST/T wave: non-specific changes; Other findings: borderline ekg. This EKG was interpreted by Jacob Gonsalves D.O. Records Reviewed: Personally, on initial evaluation    MDM:   Pilar Marley presents with complaint of dyspnea  DDX includes but is not limited to: Pneumonia, fluid overload, COPD exacerbation    Patient overall well-appearing, no acute distress, vital signs grossly within normal limits. Will obtain lab work and imaging for further evaluation of patients complaint. Will continue to monitor and evaluate patient while in the ED. Orders as below:  Orders Placed This Encounter    XR CHEST PA LAT    CBC WITH AUTOMATED DIFF    METABOLIC PANEL, BASIC    TROPONIN-HIGH SENSITIVITY    NT-PRO BNP    EKG, 12 LEAD, INITIAL    doxycycline (VIBRA-TABS) 100 mg tablet        ED Course:   ED Course as of 12/29/21 1821   Wed Dec 29, 2021   1210 Patient's chest x-ray shows findings suggestive of COPD. Patient's lab work significant for troponin 7, white count 17.3, otherwise labs grossly within normal limits. Patient declining Covid test at this time. Will provide patient with doxycycline for further treatment of possible atypical infection for her symptoms. Will discharge patient home with return precautions and follow-up recommendations. Patient verbalized understanding is without any further questions. [DV]      ED Course User Index  [DV] Lizbeth Solitario DO           Procedures:  Procedures        Diagnosis and Disposition     CLINICAL IMPRESSION:  1. Orthopnea    2. Shortness of breath    3. Simple chronic bronchitis [facility])      Discharge Medication List as of 12/29/2021  4:11 PM          Disposition: Home    Patient condition at time of disposition: Stable    DISCHARGE NOTE:   Pt has been reexamined. Patient has no new complaints, changes, or physical findings. Care plan outlined and precautions discussed. Results were reviewed with the patient. All medications were reviewed with the patient. All of pt's questions and concerns were addressed. Alarm symptoms and return precautions associated with chief complaint and evaluation were reviewed with the patient in detail. The patient demonstrated adequate understanding. Patient was instructed to follow up with PCP, Cards, as well as strict return precautions to the ED upon further deterioration. Patient is ready to go home. The patient is happy with this plan      Dragon Disclaimer     Please note that this dictation was completed with [employer], the computer voice recognition software. Quite often unanticipated grammatical, syntax, homophones, and other interpretive errors are inadvertently transcribed by the computer software. Please disregard these errors. Please excuse any errors that have escaped final proofreading. Dung ALVAREZ.

## 2022-03-18 PROBLEM — K43.9 VENTRAL HERNIA WITHOUT OBSTRUCTION OR GANGRENE: Status: ACTIVE | Noted: 2018-05-19

## 2022-03-19 PROBLEM — J98.01 BRONCHOSPASM: Status: ACTIVE | Noted: 2018-01-24

## 2022-03-19 PROBLEM — R09.02 HYPOXIA: Status: ACTIVE | Noted: 2018-01-24

## 2022-03-19 PROBLEM — I10 ESSENTIAL HYPERTENSION: Status: ACTIVE | Noted: 2018-01-30

## 2022-03-19 PROBLEM — J09.X2 INFLUENZA A (H5N1): Status: ACTIVE | Noted: 2018-01-25

## 2022-03-19 PROBLEM — E78.5 HYPERLIPIDEMIA: Status: ACTIVE | Noted: 2018-01-30

## 2022-03-19 PROBLEM — S37.30XA TRAUMA OF URETHRA: Status: ACTIVE | Noted: 2018-01-30

## 2022-03-19 PROBLEM — J45.20 MILD INTERMITTENT ASTHMA: Status: ACTIVE | Noted: 2018-01-30

## 2022-03-20 PROBLEM — E11.9 CONTROLLED TYPE 2 DIABETES MELLITUS WITHOUT COMPLICATION, WITHOUT LONG-TERM CURRENT USE OF INSULIN (HCC): Status: ACTIVE | Noted: 2018-01-30

## 2023-01-20 ENCOUNTER — APPOINTMENT (OUTPATIENT)
Dept: CT IMAGING | Age: 83
End: 2023-01-20
Attending: EMERGENCY MEDICINE
Payer: MEDICARE

## 2023-01-20 ENCOUNTER — HOSPITAL ENCOUNTER (EMERGENCY)
Age: 83
Discharge: HOME | End: 2023-01-20
Attending: EMERGENCY MEDICINE
Payer: MEDICARE

## 2023-01-20 VITALS
HEIGHT: 59 IN | TEMPERATURE: 97.8 F | WEIGHT: 198 LBS | BODY MASS INDEX: 39.92 KG/M2 | RESPIRATION RATE: 21 BRPM | SYSTOLIC BLOOD PRESSURE: 122 MMHG | HEART RATE: 69 BPM | OXYGEN SATURATION: 98 % | DIASTOLIC BLOOD PRESSURE: 96 MMHG

## 2023-01-20 DIAGNOSIS — E87.6 HYPOKALEMIA: ICD-10-CM

## 2023-01-20 DIAGNOSIS — R51.9 ACUTE NONINTRACTABLE HEADACHE, UNSPECIFIED HEADACHE TYPE: Primary | ICD-10-CM

## 2023-01-20 LAB
ANION GAP SERPL CALC-SCNC: 9 MMOL/L (ref 3–18)
APAP SERPL-MCNC: 14 UG/ML (ref 10–30)
BASOPHILS # BLD: 0 K/UL (ref 0–0.1)
BASOPHILS NFR BLD: 1 % (ref 0–2)
BUN SERPL-MCNC: 35 MG/DL (ref 7–18)
BUN/CREAT SERPL: 29 (ref 12–20)
CALCIUM SERPL-MCNC: 9.3 MG/DL (ref 8.5–10.1)
CHLORIDE SERPL-SCNC: 95 MMOL/L (ref 100–111)
CO2 SERPL-SCNC: 27 MMOL/L (ref 21–32)
CREAT SERPL-MCNC: 1.2 MG/DL (ref 0.6–1.3)
DIFFERENTIAL METHOD BLD: ABNORMAL
EOSINOPHIL # BLD: 0.2 K/UL (ref 0–0.4)
EOSINOPHIL NFR BLD: 3 % (ref 0–5)
ERYTHROCYTE [DISTWIDTH] IN BLOOD BY AUTOMATED COUNT: 12.8 % (ref 11.6–14.5)
GLUCOSE SERPL-MCNC: 296 MG/DL (ref 74–99)
HCT VFR BLD AUTO: 43.1 % (ref 35–45)
HGB BLD-MCNC: 15.1 G/DL (ref 12–16)
IMM GRANULOCYTES # BLD AUTO: 0 K/UL (ref 0–0.04)
IMM GRANULOCYTES NFR BLD AUTO: 0 % (ref 0–0.5)
LYMPHOCYTES # BLD: 2 K/UL (ref 0.9–3.6)
LYMPHOCYTES NFR BLD: 35 % (ref 21–52)
MCH RBC QN AUTO: 28.1 PG (ref 24–34)
MCHC RBC AUTO-ENTMCNC: 35 G/DL (ref 31–37)
MCV RBC AUTO: 80.1 FL (ref 78–100)
MONOCYTES # BLD: 0.4 K/UL (ref 0.05–1.2)
MONOCYTES NFR BLD: 8 % (ref 3–10)
NEUTS SEG # BLD: 3.1 K/UL (ref 1.8–8)
NEUTS SEG NFR BLD: 54 % (ref 40–73)
NRBC # BLD: 0 K/UL (ref 0–0.01)
NRBC BLD-RTO: 0 PER 100 WBC
PLATELET # BLD AUTO: 173 K/UL (ref 135–420)
PMV BLD AUTO: 12.6 FL (ref 9.2–11.8)
POTASSIUM SERPL-SCNC: 3 MMOL/L (ref 3.5–5.5)
RBC # BLD AUTO: 5.38 M/UL (ref 4.2–5.3)
SODIUM SERPL-SCNC: 131 MMOL/L (ref 136–145)
WBC # BLD AUTO: 5.8 K/UL (ref 4.6–13.2)

## 2023-01-20 PROCEDURE — 70450 CT HEAD/BRAIN W/O DYE: CPT

## 2023-01-20 PROCEDURE — 74011250637 HC RX REV CODE- 250/637: Performed by: EMERGENCY MEDICINE

## 2023-01-20 PROCEDURE — 80048 BASIC METABOLIC PNL TOTAL CA: CPT

## 2023-01-20 PROCEDURE — 80143 DRUG ASSAY ACETAMINOPHEN: CPT

## 2023-01-20 PROCEDURE — 85025 COMPLETE CBC W/AUTO DIFF WBC: CPT

## 2023-01-20 PROCEDURE — 74011250636 HC RX REV CODE- 250/636: Performed by: EMERGENCY MEDICINE

## 2023-01-20 PROCEDURE — 99284 EMERGENCY DEPT VISIT MOD MDM: CPT

## 2023-01-20 PROCEDURE — 96374 THER/PROPH/DIAG INJ IV PUSH: CPT

## 2023-01-20 RX ORDER — POTASSIUM CHLORIDE 20 MEQ/1
40 TABLET, EXTENDED RELEASE ORAL
Status: COMPLETED | OUTPATIENT
Start: 2023-01-20 | End: 2023-01-20

## 2023-01-20 RX ORDER — MORPHINE SULFATE 2 MG/ML
2 INJECTION, SOLUTION INTRAMUSCULAR; INTRAVENOUS ONCE
Status: COMPLETED | OUTPATIENT
Start: 2023-01-20 | End: 2023-01-20

## 2023-01-20 RX ORDER — ACETAMINOPHEN 500 MG
1000 TABLET ORAL ONCE
Status: DISCONTINUED | OUTPATIENT
Start: 2023-01-20 | End: 2023-01-20

## 2023-01-20 RX ORDER — IBUPROFEN 400 MG/1
800 TABLET ORAL ONCE
Status: DISCONTINUED | OUTPATIENT
Start: 2023-01-20 | End: 2023-01-20 | Stop reason: HOSPADM

## 2023-01-20 RX ADMIN — MORPHINE SULFATE 2 MG: 2 INJECTION, SOLUTION INTRAMUSCULAR; INTRAVENOUS at 11:56

## 2023-01-20 RX ADMIN — POTASSIUM CHLORIDE 40 MEQ: 1500 TABLET, EXTENDED RELEASE ORAL at 13:13

## 2023-01-20 NOTE — ED PROVIDER NOTES
EMERGENCY DEPARTMENT HISTORY AND PHYSICAL EXAM    11:04 AM patient seen at this time in room 4      Date: 1/20/2023  Patient Name: Holly Rudolph    History of Presenting Illness     Chief Complaint   Patient presents with    Headache         History Provided By: patient    Additional History (Context): Holly Rudolph is a [de-identified] y.o. female presents with 2 days of left-sided temporal headache has had cold symptoms, coughing sneezing. Rates the pain as severe constant not modifiable. No neurologic or visual deficits. Rick Lo PCP: Yi Blanca MD    Chief Complaint:   Duration:    Timing:    Location:   Quality:   Severity:   Modifying Factors:   Associated Symptoms:           Past History     Past Medical History:  Past Medical History:   Diagnosis Date    Asthma     Diabetes ([address]     Hyperlipidemia     Hypertension     Urethral trauma, sequela        Past Surgical History:  Past Surgical History:   Procedure Laterality Date    HX CHOLECYSTECTOMY      HX HYSTERECTOMY         Family History:  Family History   Problem Relation Age of Onset    Heart Disease Mother     Heart Attack Mother     Heart Disease Father     Cancer Father     Colon Cancer Father     Diabetes Daughter        Social History:  Social History     Tobacco Use    Smoking status: Never    Smokeless tobacco: Never   Substance Use Topics    Alcohol use: No    Drug use: No       Allergies:   Allergies   Allergen Reactions    Aspirin Anaphylaxis    Yellow Dye Unknown (comments)         Review of Systems     Review of Systems      Physical Exam       Patient Vitals for the past 12 hrs:   Temp Pulse Resp BP SpO2   01/20/23 1108 -- -- -- -- 98 %   01/20/23 1058 -- 69 21 (!) 122/96 91 %   01/20/23 1057 97.8 °F (36.6 °C) 72 16 (!) 170/77 94 %       IPVITALS  Patient Vitals for the past 24 hrs:   BP Temp Pulse Resp SpO2 Height Weight   01/20/23 1108 -- -- -- -- 98 % -- --   01/20/23 1058 (!) 122/96 -- 69 21 91 % -- --   01/20/23 1057 (!) 170/77 97.8 °F (36.6 °C) 72 16 94 % 4' 11\" (1.499 m) 89.8 kg (198 lb)       Physical Exam  Vitals and nursing note reviewed. Constitutional:       Appearance: She is well-developed. HENT:      Head: Normocephalic and atraumatic. Comments: No tender areas of the cranial structures or TMJ     Left Ear: Tympanic membrane normal.   Eyes:      General: No scleral icterus. Conjunctiva/sclera: Conjunctivae normal.   Neck:      Vascular: No JVD. Cardiovascular:      Rate and Rhythm: Normal rate and regular rhythm. Heart sounds: Normal heart sounds. Comments: 4 intact extremity pulses  Pulmonary:      Effort: Pulmonary effort is normal.      Breath sounds: Normal breath sounds. Abdominal:      Palpations: Abdomen is soft. There is no mass. Tenderness: There is no abdominal tenderness. Musculoskeletal:         General: Normal range of motion. Cervical back: Normal range of motion and neck supple. Lymphadenopathy:      Cervical: No cervical adenopathy. Skin:     General: Skin is warm and dry. Neurological:      General: No focal deficit present. Mental Status: She is alert. Diagnostic Study Results   Labs -  Recent Results (from the past 24 hour(s))   CBC WITH AUTOMATED DIFF    Collection Time: 01/20/23 11:05 AM   Result Value Ref Range    WBC 5.8 4.6 - 13.2 K/uL    RBC 5.38 (H) 4.20 - 5.30 M/uL    HGB 15.1 12.0 - 16.0 g/dL    HCT 43.1 35.0 - 45.0 %    MCV 80.1 78.0 - 100.0 FL    MCH 28.1 24.0 - 34.0 PG    MCHC 35.0 31.0 - 37.0 g/dL    RDW 12.8 11.6 - 14.5 %    PLATELET 246 487 - 261 K/uL    MPV 12.6 (H) 9.2 - 11.8 FL    NRBC 0.0 0  WBC    ABSOLUTE NRBC 0.00 0.00 - 0.01 K/uL    NEUTROPHILS 54 40 - 73 %    LYMPHOCYTES 35 21 - 52 %    MONOCYTES 8 3 - 10 %    EOSINOPHILS 3 0 - 5 %    BASOPHILS 1 0 - 2 %    IMMATURE GRANULOCYTES 0 0.0 - 0.5 %    ABS. NEUTROPHILS 3.1 1.8 - 8.0 K/UL    ABS. LYMPHOCYTES 2.0 0.9 - 3.6 K/UL    ABS. MONOCYTES 0.4 0.05 - 1.2 K/UL    ABS. EOSINOPHILS 0.2 0.0 - 0.4 K/UL    ABS. BASOPHILS 0.0 0.0 - 0.1 K/UL    ABS. IMM. GRANS. 0.0 0.00 - 0.04 K/UL    DF AUTOMATED     METABOLIC PANEL, BASIC    Collection Time: 01/20/23 11:05 AM   Result Value Ref Range    Sodium 131 (L) 136 - 145 mmol/L    Potassium 3.0 (L) 3.5 - 5.5 mmol/L    Chloride 95 (L) 100 - 111 mmol/L    CO2 27 21 - 32 mmol/L    Anion gap 9 3.0 - 18 mmol/L    Glucose 296 (H) 74 - 99 mg/dL    BUN 35 (H) 7.0 - 18 MG/DL    Creatinine 1.20 0.6 - 1.3 MG/DL    BUN/Creatinine ratio 29 (H) 12 - 20      eGFR 46 (L) >60 ml/min/1.73m2    Calcium 9.3 8.5 - 10.1 MG/DL   ACETAMINOPHEN    Collection Time: 01/20/23 11:05 AM   Result Value Ref Range    Acetaminophen level 14 10.0 - 30.0 ug/mL       Radiologic Studies -   CT HEAD WO CONT   Final Result      1. No acute intracranial abnormality. 2.  Mild progression of microvascular ischemic disease. 3.  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 Thank you for your referral.        CT HEAD WO CONT    Result Date: 1/20/2023  CT of the head without contrast HISTORY: Left temporal headache COMPARISON: CT 81/91/71 TECHNIQUE: Helical axial scan to the head was performed from the skull base to the vertex without IV contrast administration. All CT scans at this facility performed using dose optimization techniques as appreciated to a performed exam, to include automated exposure control, adjustment of the mA and or KU according to patient size (including appropriate matching for site specific examination), or use of iterative reconstruction technique. FINDINGS: Brain volume appears unremarkable for age. The gray-white matter differentiation is preserved. No ventricular dilatation. Patchy periventricular white matter hypodensities appear mildly interval progressed. Old lacunar infarcts in bilateral caudate nuclei present. There is no evidence of acute intracranial hemorrhage or mass effect identified.  No skull fracture or extra axial fluid collections identified. Visualized sinuses and mastoid air cells appear unremarkable. 1.  No acute intracranial abnormality. 2.  Mild progression of microvascular ischemic disease. 3.  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 Thank you for your referral.     Medications ordered:   Medications   ibuprofen (MOTRIN) tablet 800 mg (has no administration in time range)   morphine injection 2 mg (2 mg IntraVENous Given 1/20/23 1156)   potassium chloride (K-DUR, KLOR-CON M20) SR tablet 40 mEq (40 mEq Oral Given 1/20/23 1313)         Medical Decision Making   Initial Medical Decision Making and DDx:  Some concern for intracranial hemorrhage or other affect. More likely benign cause of headache, tension, nonspecific. Does not sound like cluster migraine or trigeminal neuralgia does not seem to be coming from the TMJ or the ear    ED Course: Progress Notes, Reevaluation, and Consults:  ED Course as of 01/20/23 1316   Fri Jan 20, 2023   1232 CT negative for stroke bleed abscess mass-effect etc.  Discussed with the patient her headache is much better, mild intensity now. Unclear cause. She does have depression thinks this may be stress its possible. Reviewed her allergy to aspirin, does not recall trying ibuprofen, informed discussion about possible cross reaction but low likelihood and she is willing to take a dose. She says she literally finished a bottle of Tylenol trying to treat this headache says it was a small bottle of extra strength but does not know the quantity. Tylenol level added [CB]      ED Course User Index  [CB] Billy Spangler MD     No Tylenol toxicity. Patient will be discharged, potassium repleted. I am the first provider for this patient. I reviewed the vital signs, available nursing notes, past medical history, past surgical history, family history and social history.     Patient Vitals for the past 12 hrs:   Temp Pulse Resp BP SpO2   01/20/23 1108 -- -- -- -- 98 % 01/20/23 1058 -- 69 21 (!) 122/96 91 %   01/20/23 1057 97.8 °F (36.6 °C) 72 16 (!) 170/77 94 %       Vital Signs-Reviewed the patient's vital signs. Pulse Oximetry Analysis, Cardiac Monitor, 12 lead ekg:      Interpreted by the EP. Records Reviewed: Nursing notes reviewed (Time of Review: 11:04 AM)    Procedures:   Critical Care Time: 0  If critical care time is note it is exclusive of any separately billable procedures. Aspirin: (was aspirin given for stroke?)    Diagnosis     Clinical Impression:   1. Acute nonintractable headache, unspecified headache type    2.  Hypokalemia        Disposition: Discharged      Follow-up Information       Follow up With Specialties Details Why Contact Info    Mariana Prakash MD Internal Medicine Physician, Internal Medicine Physician In 2 days  [address]  [facility]  [phone]               Patient's Medications    No medications on file     _______________________________    Notes:    Kerrie Alston MD using Dragon dictation      _______________________________

## 2023-03-22 NOTE — ED TRIAGE NOTES
Pt c/o headache x 2 days. Consent 2/Introductory Paragraph: Mohs surgery was explained to the patient and consent was obtained. The risks, benefits and alternatives to therapy were discussed in detail. Specifically, the risks of infection, scarring, bleeding, prolonged wound healing, incomplete removal, allergy to anesthesia, nerve injury and recurrence were addressed. Prior to the procedure, the treatment site was clearly identified and confirmed by the patient. All components of Universal Protocol/PAUSE Rule completed.

## 2023-09-22 ENCOUNTER — HOSPITAL ENCOUNTER (EMERGENCY)
Facility: HOSPITAL | Age: 83
Discharge: LEFT BEFORE DISCHARGE | End: 2023-09-22
Payer: MEDICARE

## 2023-09-22 ENCOUNTER — APPOINTMENT (OUTPATIENT)
Facility: HOSPITAL | Age: 83
End: 2023-09-22
Payer: MEDICARE

## 2023-09-22 VITALS
RESPIRATION RATE: 14 BRPM | TEMPERATURE: 98.3 F | DIASTOLIC BLOOD PRESSURE: 66 MMHG | WEIGHT: 192 LBS | HEART RATE: 69 BPM | OXYGEN SATURATION: 96 % | BODY MASS INDEX: 41.42 KG/M2 | SYSTOLIC BLOOD PRESSURE: 148 MMHG | HEIGHT: 57 IN

## 2023-09-22 DIAGNOSIS — M25.531 RIGHT WRIST PAIN: Primary | ICD-10-CM

## 2023-09-22 LAB
ANION GAP SERPL CALC-SCNC: 6 MMOL/L (ref 3–18)
BASOPHILS # BLD: 0.1 K/UL (ref 0–0.1)
BASOPHILS NFR BLD: 1 % (ref 0–2)
BUN SERPL-MCNC: 14 MG/DL (ref 7–18)
BUN/CREAT SERPL: 18 (ref 12–20)
CALCIUM SERPL-MCNC: 9.3 MG/DL (ref 8.5–10.1)
CHLORIDE SERPL-SCNC: 97 MMOL/L (ref 100–111)
CO2 SERPL-SCNC: 31 MMOL/L (ref 21–32)
CREAT SERPL-MCNC: 0.8 MG/DL (ref 0.6–1.3)
DIFFERENTIAL METHOD BLD: ABNORMAL
EOSINOPHIL # BLD: 0.2 K/UL (ref 0–0.4)
EOSINOPHIL NFR BLD: 2 % (ref 0–5)
ERYTHROCYTE [DISTWIDTH] IN BLOOD BY AUTOMATED COUNT: 12.5 % (ref 11.6–14.5)
GLUCOSE SERPL-MCNC: 332 MG/DL (ref 74–99)
HCT VFR BLD AUTO: 39.8 % (ref 35–45)
HGB BLD-MCNC: 13.3 G/DL (ref 12–16)
IMM GRANULOCYTES # BLD AUTO: 0 K/UL (ref 0–0.04)
IMM GRANULOCYTES NFR BLD AUTO: 0 % (ref 0–0.5)
LYMPHOCYTES # BLD: 2.5 K/UL (ref 0.9–3.6)
LYMPHOCYTES NFR BLD: 33 % (ref 21–52)
MCH RBC QN AUTO: 27.3 PG (ref 24–34)
MCHC RBC AUTO-ENTMCNC: 33.4 G/DL (ref 31–37)
MCV RBC AUTO: 81.7 FL (ref 78–100)
MONOCYTES # BLD: 0.5 K/UL (ref 0.05–1.2)
MONOCYTES NFR BLD: 7 % (ref 3–10)
NEUTS SEG # BLD: 4.2 K/UL (ref 1.8–8)
NEUTS SEG NFR BLD: 57 % (ref 40–73)
NRBC # BLD: 0 K/UL (ref 0–0.01)
NRBC BLD-RTO: 0 PER 100 WBC
PLATELET # BLD AUTO: 169 K/UL (ref 135–420)
PMV BLD AUTO: 13.3 FL (ref 9.2–11.8)
POTASSIUM SERPL-SCNC: 3.9 MMOL/L (ref 3.5–5.5)
RBC # BLD AUTO: 4.87 M/UL (ref 4.2–5.3)
SODIUM SERPL-SCNC: 134 MMOL/L (ref 136–145)
URATE SERPL-MCNC: 3.5 MG/DL (ref 2.6–7.2)
WBC # BLD AUTO: 7.5 K/UL (ref 4.6–13.2)

## 2023-09-22 PROCEDURE — 99284 EMERGENCY DEPT VISIT MOD MDM: CPT

## 2023-09-22 PROCEDURE — 84550 ASSAY OF BLOOD/URIC ACID: CPT

## 2023-09-22 PROCEDURE — 80048 BASIC METABOLIC PNL TOTAL CA: CPT

## 2023-09-22 PROCEDURE — 73100 X-RAY EXAM OF WRIST: CPT

## 2023-09-22 PROCEDURE — 6370000000 HC RX 637 (ALT 250 FOR IP): Performed by: PHYSICIAN ASSISTANT

## 2023-09-22 PROCEDURE — 85025 COMPLETE CBC W/AUTO DIFF WBC: CPT

## 2023-09-22 RX ORDER — ACETAMINOPHEN 325 MG/1
650 TABLET ORAL
Status: COMPLETED | OUTPATIENT
Start: 2023-09-22 | End: 2023-09-22

## 2023-09-22 RX ADMIN — ACETAMINOPHEN 325MG 650 MG: 325 TABLET ORAL at 15:53

## 2023-09-22 ASSESSMENT — ENCOUNTER SYMPTOMS
ABDOMINAL PAIN: 0
SHORTNESS OF BREATH: 0
NAUSEA: 0
VOMITING: 0
COLOR CHANGE: 0

## 2023-09-22 NOTE — ED NOTES
Pt stating that she \"doesn't have time to wait\". Attempting to leave ED with IV in place. Staff stopped pt and removed IV before pt departed ED. PA notified of elopement.       Magdaleno Jacobs RN  09/22/23 [id number]

## 2023-09-22 NOTE — ED TRIAGE NOTES
Client reports knitting to long over 2 days ago, having sharp intermittent pain in r. Wrist. Denies any fall or truama. NAD. Pain 9/10.

## 2023-09-25 ENCOUNTER — HOSPITAL ENCOUNTER (EMERGENCY)
Facility: HOSPITAL | Age: 83
Discharge: HOME | End: 2023-09-25
Attending: STUDENT IN AN ORGANIZED HEALTH CARE EDUCATION/TRAINING PROGRAM
Payer: MEDICARE

## 2023-09-25 VITALS
WEIGHT: 192 LBS | HEIGHT: 57 IN | BODY MASS INDEX: 41.42 KG/M2 | RESPIRATION RATE: 18 BRPM | OXYGEN SATURATION: 100 % | HEART RATE: 70 BPM | SYSTOLIC BLOOD PRESSURE: 134 MMHG | DIASTOLIC BLOOD PRESSURE: 70 MMHG | TEMPERATURE: 97.9 F

## 2023-09-25 DIAGNOSIS — R10.84 GENERALIZED ABDOMINAL PAIN: ICD-10-CM

## 2023-09-25 DIAGNOSIS — M79.601 RIGHT ARM PAIN: Primary | ICD-10-CM

## 2023-09-25 DIAGNOSIS — R73.9 HYPERGLYCEMIA: ICD-10-CM

## 2023-09-25 LAB — GLUCOSE BLD STRIP.AUTO-MCNC: 227 MG/DL (ref 70–110)

## 2023-09-25 PROCEDURE — 99283 EMERGENCY DEPT VISIT LOW MDM: CPT

## 2023-09-25 PROCEDURE — 82962 GLUCOSE BLOOD TEST: CPT

## 2023-09-25 RX ORDER — DOCUSATE SODIUM 100 MG/1
100 CAPSULE, LIQUID FILLED ORAL 2 TIMES DAILY
Qty: 60 CAPSULE | Refills: 0 | Status: SHIPPED | OUTPATIENT
Start: 2023-09-25

## 2023-09-25 ASSESSMENT — PAIN - FUNCTIONAL ASSESSMENT: PAIN_FUNCTIONAL_ASSESSMENT: 0-10

## 2023-09-25 ASSESSMENT — PAIN SCALES - GENERAL: PAINLEVEL_OUTOF10: 9

## 2023-09-25 NOTE — ED NOTES
I have reviewed discharge instructions with the patient. The patient verbalized understanding.  Patient armband removed and shredded      Ian Perry RN  09/25/23 [id number]

## 2023-09-25 NOTE — ED TRIAGE NOTES
Patient arrived to ER with multiple complaints. Pain to right arm, right leg, and abdominal pain. Patient states pain is intermittent since last week. Went to be seen at [facility] last week, then left before treatment was complete. Denies any fall, states she was told she had gout.

## 2023-09-25 NOTE — DISCHARGE INSTRUCTIONS
Your blood sugar was high today. Please take metformin and follow-up with a primary care provider so they can provide better diabetes control. Drink lots of fluids as high sugar makes you dehydrated, and can contribute to worsening muscle pain. Use exercises provided in this pamphlet to help with wrist discomfort. Recommend holding off on crocheting, until pain improves. Recommend taking ibuprofen 600 mg every 6 hours as needed for pain and discomfort.

## 2024-05-28 ENCOUNTER — OFFICE VISIT (OUTPATIENT)
Facility: CLINIC | Age: 84
End: 2024-05-28
Payer: MEDICARE

## 2024-05-28 ENCOUNTER — HOSPITAL ENCOUNTER (OUTPATIENT)
Facility: HOSPITAL | Age: 84
Setting detail: SPECIMEN
Discharge: HOME | End: 2024-05-31
Payer: MEDICARE

## 2024-05-28 VITALS
HEIGHT: 57 IN | TEMPERATURE: 98.6 F | RESPIRATION RATE: 16 BRPM | OXYGEN SATURATION: 97 % | BODY MASS INDEX: 27.18 KG/M2 | SYSTOLIC BLOOD PRESSURE: 162 MMHG | HEART RATE: 68 BPM | DIASTOLIC BLOOD PRESSURE: 68 MMHG | WEIGHT: 126 LBS

## 2024-05-28 DIAGNOSIS — E78.5 HYPERLIPIDEMIA, UNSPECIFIED HYPERLIPIDEMIA TYPE: ICD-10-CM

## 2024-05-28 DIAGNOSIS — E11.65 TYPE 2 DIABETES MELLITUS WITH HYPERGLYCEMIA, WITHOUT LONG-TERM CURRENT USE OF INSULIN (HCC): Primary | ICD-10-CM

## 2024-05-28 DIAGNOSIS — E11.65 TYPE 2 DIABETES MELLITUS WITH HYPERGLYCEMIA, WITHOUT LONG-TERM CURRENT USE OF INSULIN (HCC): ICD-10-CM

## 2024-05-28 DIAGNOSIS — E55.9 VITAMIN D DEFICIENCY: ICD-10-CM

## 2024-05-28 DIAGNOSIS — I10 ESSENTIAL HYPERTENSION: ICD-10-CM

## 2024-05-28 DIAGNOSIS — Z00.00 ENCOUNTER FOR MEDICAL EXAMINATION TO ESTABLISH CARE: ICD-10-CM

## 2024-05-28 LAB
25(OH)D3 SERPL-MCNC: 15.2 NG/ML (ref 30–100)
ALBUMIN SERPL-MCNC: 3.6 G/DL (ref 3.4–5)
ALBUMIN/GLOB SERPL: 0.9 (ref 0.8–1.7)
ALP SERPL-CCNC: 78 U/L (ref 45–117)
ALT SERPL-CCNC: 14 U/L (ref 13–56)
ANION GAP SERPL CALC-SCNC: 8 MMOL/L (ref 3–18)
AST SERPL-CCNC: 12 U/L (ref 10–38)
BASOPHILS # BLD: 0.1 K/UL (ref 0–0.1)
BASOPHILS NFR BLD: 1 % (ref 0–2)
BILIRUB SERPL-MCNC: 0.5 MG/DL (ref 0.2–1)
BUN SERPL-MCNC: 12 MG/DL (ref 7–18)
BUN/CREAT SERPL: 19 (ref 12–20)
CALCIUM SERPL-MCNC: 9.7 MG/DL (ref 8.5–10.1)
CHLORIDE SERPL-SCNC: 103 MMOL/L (ref 100–111)
CHOLEST SERPL-MCNC: 161 MG/DL
CO2 SERPL-SCNC: 26 MMOL/L (ref 21–32)
CREAT SERPL-MCNC: 0.64 MG/DL (ref 0.6–1.3)
CREAT UR-MCNC: 102 MG/DL (ref 30–125)
DIFFERENTIAL METHOD BLD: ABNORMAL
EOSINOPHIL # BLD: 0.1 K/UL (ref 0–0.4)
EOSINOPHIL NFR BLD: 2 % (ref 0–5)
ERYTHROCYTE [DISTWIDTH] IN BLOOD BY AUTOMATED COUNT: 12.5 % (ref 11.6–14.5)
EST. AVERAGE GLUCOSE BLD GHB EST-MCNC: 157 MG/DL
FOLATE SERPL-MCNC: 15.2 NG/ML (ref 3.1–17.5)
GLOBULIN SER CALC-MCNC: 3.9 G/DL (ref 2–4)
GLUCOSE SERPL-MCNC: 154 MG/DL (ref 74–99)
HBA1C MFR BLD: 7.1 % (ref 4.2–5.6)
HCT VFR BLD AUTO: 37 % (ref 35–45)
HDLC SERPL-MCNC: 46 MG/DL (ref 40–60)
HDLC SERPL: 3.5 (ref 0–5)
HGB BLD-MCNC: 11.8 G/DL (ref 12–16)
IMM GRANULOCYTES # BLD AUTO: 0 K/UL (ref 0–0.04)
IMM GRANULOCYTES NFR BLD AUTO: 0 % (ref 0–0.5)
LDLC SERPL CALC-MCNC: 99.4 MG/DL (ref 0–100)
LIPID PANEL: NORMAL
LYMPHOCYTES # BLD: 2 K/UL (ref 0.9–3.6)
LYMPHOCYTES NFR BLD: 34 % (ref 21–52)
MCH RBC QN AUTO: 27.3 PG (ref 24–34)
MCHC RBC AUTO-ENTMCNC: 31.9 G/DL (ref 31–37)
MCV RBC AUTO: 85.5 FL (ref 78–100)
MICROALBUMIN UR-MCNC: 9.27 MG/DL (ref 0–3)
MICROALBUMIN/CREAT UR-RTO: 91 MG/G (ref 0–30)
MONOCYTES # BLD: 0.4 K/UL (ref 0.05–1.2)
MONOCYTES NFR BLD: 6 % (ref 3–10)
NEUTS SEG # BLD: 3.4 K/UL (ref 1.8–8)
NEUTS SEG NFR BLD: 56 % (ref 40–73)
NRBC # BLD: 0 K/UL (ref 0–0.01)
NRBC BLD-RTO: 0 PER 100 WBC
PLATELET # BLD AUTO: 196 K/UL (ref 135–420)
PMV BLD AUTO: 13.1 FL (ref 9.2–11.8)
POTASSIUM SERPL-SCNC: 4.1 MMOL/L (ref 3.5–5.5)
PROT SERPL-MCNC: 7.5 G/DL (ref 6.4–8.2)
RBC # BLD AUTO: 4.33 M/UL (ref 4.2–5.3)
SODIUM SERPL-SCNC: 137 MMOL/L (ref 136–145)
TRIGL SERPL-MCNC: 78 MG/DL
TSH SERPL DL<=0.05 MIU/L-ACNC: 1.54 UIU/ML (ref 0.36–3.74)
VIT B12 SERPL-MCNC: 388 PG/ML (ref 211–911)
VLDLC SERPL CALC-MCNC: 15.6 MG/DL
WBC # BLD AUTO: 6 K/UL (ref 4.6–13.2)

## 2024-05-28 PROCEDURE — 3078F DIAST BP <80 MM HG: CPT | Performed by: STUDENT IN AN ORGANIZED HEALTH CARE EDUCATION/TRAINING PROGRAM

## 2024-05-28 PROCEDURE — 82306 VITAMIN D 25 HYDROXY: CPT

## 2024-05-28 PROCEDURE — 85025 COMPLETE CBC W/AUTO DIFF WBC: CPT

## 2024-05-28 PROCEDURE — 82570 ASSAY OF URINE CREATININE: CPT

## 2024-05-28 PROCEDURE — 3077F SYST BP >= 140 MM HG: CPT | Performed by: STUDENT IN AN ORGANIZED HEALTH CARE EDUCATION/TRAINING PROGRAM

## 2024-05-28 PROCEDURE — 1090F PRES/ABSN URINE INCON ASSESS: CPT | Performed by: STUDENT IN AN ORGANIZED HEALTH CARE EDUCATION/TRAINING PROGRAM

## 2024-05-28 PROCEDURE — 83036 HEMOGLOBIN GLYCOSYLATED A1C: CPT

## 2024-05-28 PROCEDURE — 82746 ASSAY OF FOLIC ACID SERUM: CPT

## 2024-05-28 PROCEDURE — G8399 PT W/DXA RESULTS DOCUMENT: HCPCS | Performed by: STUDENT IN AN ORGANIZED HEALTH CARE EDUCATION/TRAINING PROGRAM

## 2024-05-28 PROCEDURE — 1123F ACP DISCUSS/DSCN MKR DOCD: CPT | Performed by: STUDENT IN AN ORGANIZED HEALTH CARE EDUCATION/TRAINING PROGRAM

## 2024-05-28 PROCEDURE — 99204 OFFICE O/P NEW MOD 45 MIN: CPT | Performed by: STUDENT IN AN ORGANIZED HEALTH CARE EDUCATION/TRAINING PROGRAM

## 2024-05-28 PROCEDURE — 36415 COLL VENOUS BLD VENIPUNCTURE: CPT

## 2024-05-28 PROCEDURE — G8419 CALC BMI OUT NRM PARAM NOF/U: HCPCS | Performed by: STUDENT IN AN ORGANIZED HEALTH CARE EDUCATION/TRAINING PROGRAM

## 2024-05-28 PROCEDURE — 80053 COMPREHEN METABOLIC PANEL: CPT

## 2024-05-28 PROCEDURE — 84443 ASSAY THYROID STIM HORMONE: CPT

## 2024-05-28 PROCEDURE — 80061 LIPID PANEL: CPT

## 2024-05-28 PROCEDURE — G8427 DOCREV CUR MEDS BY ELIG CLIN: HCPCS | Performed by: STUDENT IN AN ORGANIZED HEALTH CARE EDUCATION/TRAINING PROGRAM

## 2024-05-28 PROCEDURE — 1036F TOBACCO NON-USER: CPT | Performed by: STUDENT IN AN ORGANIZED HEALTH CARE EDUCATION/TRAINING PROGRAM

## 2024-05-28 PROCEDURE — 82607 VITAMIN B-12: CPT

## 2024-05-28 PROCEDURE — 82043 UR ALBUMIN QUANTITATIVE: CPT

## 2024-05-28 RX ORDER — AMLODIPINE BESYLATE 5 MG/1
5 TABLET ORAL DAILY
Qty: 90 TABLET | Refills: 0 | Status: SHIPPED | OUTPATIENT
Start: 2024-05-28

## 2024-05-28 SDOH — ECONOMIC STABILITY: FOOD INSECURITY: WITHIN THE PAST 12 MONTHS, YOU WORRIED THAT YOUR FOOD WOULD RUN OUT BEFORE YOU GOT MONEY TO BUY MORE.: NEVER TRUE

## 2024-05-28 SDOH — ECONOMIC STABILITY: HOUSING INSECURITY
IN THE LAST 12 MONTHS, WAS THERE A TIME WHEN YOU DID NOT HAVE A STEADY PLACE TO SLEEP OR SLEPT IN A SHELTER (INCLUDING NOW)?: NO

## 2024-05-28 SDOH — ECONOMIC STABILITY: FOOD INSECURITY: WITHIN THE PAST 12 MONTHS, THE FOOD YOU BOUGHT JUST DIDN'T LAST AND YOU DIDN'T HAVE MONEY TO GET MORE.: NEVER TRUE

## 2024-05-28 SDOH — ECONOMIC STABILITY: INCOME INSECURITY: HOW HARD IS IT FOR YOU TO PAY FOR THE VERY BASICS LIKE FOOD, HOUSING, MEDICAL CARE, AND HEATING?: NOT HARD AT ALL

## 2024-05-28 ASSESSMENT — ENCOUNTER SYMPTOMS
ABDOMINAL PAIN: 0
VOMITING: 0
WHEEZING: 0
NAUSEA: 0
RHINORRHEA: 0
BLOOD IN STOOL: 0
DIARRHEA: 0
BACK PAIN: 0
COUGH: 0
SHORTNESS OF BREATH: 0
CHEST TIGHTNESS: 0

## 2024-05-28 ASSESSMENT — PATIENT HEALTH QUESTIONNAIRE - PHQ9
SUM OF ALL RESPONSES TO PHQ QUESTIONS 1-9: 0
1. LITTLE INTEREST OR PLEASURE IN DOING THINGS: NOT AT ALL
2. FEELING DOWN, DEPRESSED OR HOPELESS: NOT AT ALL
SUM OF ALL RESPONSES TO PHQ QUESTIONS 1-9: 0
SUM OF ALL RESPONSES TO PHQ9 QUESTIONS 1 & 2: 0

## 2024-05-28 ASSESSMENT — ANXIETY QUESTIONNAIRES
6. BECOMING EASILY ANNOYED OR IRRITABLE: NOT AT ALL
1. FEELING NERVOUS, ANXIOUS, OR ON EDGE: NOT AT ALL
IF YOU CHECKED OFF ANY PROBLEMS ON THIS QUESTIONNAIRE, HOW DIFFICULT HAVE THESE PROBLEMS MADE IT FOR YOU TO DO YOUR WORK, TAKE CARE OF THINGS AT HOME, OR GET ALONG WITH OTHER PEOPLE: NOT DIFFICULT AT ALL
5. BEING SO RESTLESS THAT IT IS HARD TO SIT STILL: NOT AT ALL
4. TROUBLE RELAXING: NOT AT ALL
3. WORRYING TOO MUCH ABOUT DIFFERENT THINGS: NOT AT ALL
7. FEELING AFRAID AS IF SOMETHING AWFUL MIGHT HAPPEN: NOT AT ALL
2. NOT BEING ABLE TO STOP OR CONTROL WORRYING: NOT AT ALL
GAD7 TOTAL SCORE: 0

## 2024-05-28 NOTE — PROGRESS NOTES
Mara Pantoja is a 83 y.o. female presenting today for Establish Care  .     Chief Complaint   Patient presents with    Establish Care       HPI:  Mara Pantoja presents to the office today to establish care.    Patient has a past medical history of HTN, HLD, T2DM, asthma.    Patient reports she has not seen a PCP in over 2 years    T2DM: Last A1c on file was uncontrolled at 11.2% in 2/2020.  Labs recently show elevated sugars.  She is not taking any medications at this time.  She was previously on metformin.  Patient reports she had diabetes 20 years ago and thinks 'it is coming back.'  From chart review-appears patient has lost significant weight unintentionally over the past year.    HTN: BP is elevated. From PCP note in 2019 - she was prescribed losartan    HLD: Patient used to take Zocor.  Currently not on any medication    Vitamin D deficiency: She was previously on vitamin D supplement    Review of Systems   Constitutional:  Negative for activity change, appetite change, chills, diaphoresis, fatigue, fever and unexpected weight change.   HENT:  Negative for congestion, nosebleeds, postnasal drip and rhinorrhea.    Eyes:  Positive for visual disturbance.   Respiratory:  Negative for cough, chest tightness, shortness of breath and wheezing.    Cardiovascular:  Negative for chest pain and leg swelling.   Gastrointestinal:  Negative for abdominal pain, blood in stool, diarrhea, nausea and vomiting.   Endocrine: Negative for polydipsia and polyphagia.   Genitourinary:  Negative for decreased urine volume, dysuria, frequency and pelvic pain.   Musculoskeletal:  Positive for arthralgias. Negative for back pain, myalgias and neck pain.   Neurological:  Negative for dizziness, tremors, seizures, syncope, speech difficulty, weakness, numbness and headaches.   Psychiatric/Behavioral:  Negative for agitation and confusion. The patient is not nervous/anxious.    All other systems reviewed and are

## 2024-08-29 DIAGNOSIS — I10 ESSENTIAL HYPERTENSION: ICD-10-CM

## 2024-08-29 RX ORDER — AMLODIPINE BESYLATE 5 MG/1
5 TABLET ORAL DAILY
Qty: 90 TABLET | Refills: 0 | Status: SHIPPED | OUTPATIENT
Start: 2024-08-29

## 2024-10-02 NOTE — PROGRESS NOTES
Pt wanting something to eat. Hospitalist paged, awaiting MD assessment for diet order. Pt aware and in no acute distress. 15

## 2024-11-23 ENCOUNTER — TELEPHONE (OUTPATIENT)
Facility: CLINIC | Age: 84
End: 2024-11-23

## 2024-11-23 DIAGNOSIS — E11.65 TYPE 2 DIABETES MELLITUS WITH HYPERGLYCEMIA, WITHOUT LONG-TERM CURRENT USE OF INSULIN (HCC): ICD-10-CM

## 2024-11-23 DIAGNOSIS — I10 ESSENTIAL HYPERTENSION: ICD-10-CM

## 2024-11-25 RX ORDER — AMLODIPINE BESYLATE 5 MG/1
5 TABLET ORAL DAILY
Qty: 90 TABLET | Refills: 0 | Status: SHIPPED | OUTPATIENT
Start: 2024-11-25

## 2024-11-26 ENCOUNTER — TELEPHONE (OUTPATIENT)
Facility: CLINIC | Age: 84
End: 2024-11-26

## 2025-01-09 ENCOUNTER — TRANSCRIBE ORDERS (OUTPATIENT)
Facility: HOSPITAL | Age: 85
End: 2025-01-09

## 2025-01-09 DIAGNOSIS — I50.9 CONGESTIVE HEART FAILURE, UNSPECIFIED HF CHRONICITY, UNSPECIFIED HEART FAILURE TYPE (HCC): Primary | ICD-10-CM

## 2025-03-02 ENCOUNTER — HOSPITAL ENCOUNTER (EMERGENCY)
Facility: HOSPITAL | Age: 85
Discharge: HOME | End: 2025-03-02
Attending: EMERGENCY MEDICINE
Payer: MEDICARE

## 2025-03-02 ENCOUNTER — APPOINTMENT (OUTPATIENT)
Facility: HOSPITAL | Age: 85
End: 2025-03-02
Payer: MEDICARE

## 2025-03-02 VITALS
BODY MASS INDEX: 22.78 KG/M2 | SYSTOLIC BLOOD PRESSURE: 167 MMHG | DIASTOLIC BLOOD PRESSURE: 64 MMHG | HEART RATE: 77 BPM | WEIGHT: 113 LBS | HEIGHT: 59 IN | OXYGEN SATURATION: 96 % | RESPIRATION RATE: 22 BRPM | TEMPERATURE: 98.7 F

## 2025-03-02 DIAGNOSIS — J20.9 ACUTE BRONCHITIS, UNSPECIFIED ORGANISM: ICD-10-CM

## 2025-03-02 DIAGNOSIS — J45.21 MILD INTERMITTENT ASTHMA WITH EXACERBATION: Primary | ICD-10-CM

## 2025-03-02 LAB
FLUAV RNA SPEC QL NAA+PROBE: NOT DETECTED
FLUBV RNA SPEC QL NAA+PROBE: NOT DETECTED
SARS-COV-2 RNA RESP QL NAA+PROBE: NOT DETECTED
SOURCE: NORMAL

## 2025-03-02 PROCEDURE — 87636 SARSCOV2 & INF A&B AMP PRB: CPT

## 2025-03-02 PROCEDURE — 71046 X-RAY EXAM CHEST 2 VIEWS: CPT

## 2025-03-02 PROCEDURE — 99284 EMERGENCY DEPT VISIT MOD MDM: CPT

## 2025-03-02 PROCEDURE — 6370000000 HC RX 637 (ALT 250 FOR IP)

## 2025-03-02 RX ORDER — IPRATROPIUM BROMIDE AND ALBUTEROL SULFATE 2.5; .5 MG/3ML; MG/3ML
1 SOLUTION RESPIRATORY (INHALATION)
Status: COMPLETED | OUTPATIENT
Start: 2025-03-02 | End: 2025-03-02

## 2025-03-02 RX ORDER — BENZONATATE 100 MG/1
100 CAPSULE ORAL 2 TIMES DAILY PRN
Qty: 20 CAPSULE | Refills: 0 | Status: SHIPPED | OUTPATIENT
Start: 2025-03-02

## 2025-03-02 RX ORDER — PREDNISONE 20 MG/1
20 TABLET ORAL 2 TIMES DAILY
Qty: 10 TABLET | Refills: 0 | Status: SHIPPED | OUTPATIENT
Start: 2025-03-02 | End: 2025-03-07

## 2025-03-02 RX ORDER — ALBUTEROL SULFATE 90 UG/1
2 INHALANT RESPIRATORY (INHALATION) 4 TIMES DAILY PRN
Qty: 54 G | Refills: 1 | Status: SHIPPED | OUTPATIENT
Start: 2025-03-02

## 2025-03-02 RX ORDER — PREDNISONE 20 MG/1
20 TABLET ORAL 2 TIMES DAILY
Qty: 10 TABLET | Refills: 0 | Status: SHIPPED | OUTPATIENT
Start: 2025-03-02 | End: 2025-03-02

## 2025-03-02 RX ORDER — BENZONATATE 100 MG/1
100 CAPSULE ORAL 2 TIMES DAILY PRN
Qty: 20 CAPSULE | Refills: 0 | Status: SHIPPED | OUTPATIENT
Start: 2025-03-02 | End: 2025-03-02

## 2025-03-02 RX ORDER — ALBUTEROL SULFATE 90 UG/1
2 INHALANT RESPIRATORY (INHALATION) 4 TIMES DAILY PRN
Qty: 54 G | Refills: 1 | Status: SHIPPED | OUTPATIENT
Start: 2025-03-02 | End: 2025-03-02

## 2025-03-02 RX ADMIN — IPRATROPIUM BROMIDE AND ALBUTEROL SULFATE 1 DOSE: .5; 3 SOLUTION RESPIRATORY (INHALATION) at 13:59

## 2025-03-02 ASSESSMENT — LIFESTYLE VARIABLES
HOW OFTEN DO YOU HAVE A DRINK CONTAINING ALCOHOL: NEVER
HOW MANY STANDARD DRINKS CONTAINING ALCOHOL DO YOU HAVE ON A TYPICAL DAY: PATIENT DOES NOT DRINK

## 2025-03-02 ASSESSMENT — PAIN - FUNCTIONAL ASSESSMENT: PAIN_FUNCTIONAL_ASSESSMENT: NONE - DENIES PAIN

## 2025-03-02 NOTE — ED PROVIDER NOTES
EMERGENCY DEPARTMENT HISTORY AND PHYSICAL EXAM      Date: 3/2/2025  Patient Name: Mara Pantoja    History of Presenting Illness     Chief Complaint   Patient presents with    Cough       History (Context): Mara Pantoja is a 84 y.o. female with history of HTN, HLD, DM2 and asthma presents to the ED today with chief complaint of cough. Reports productive cough with yellow sputum for 3-4 days. Endorses associated congestion, rhinorrhea. Denies fever, chills, CP, SOB palpitations, abdominal pain, n/v/c/d, urinary symptoms, swelling. Reports history of asthma years ago but does not feel like she is wheezing.      PCP: Smiley Giron MD    No current facility-administered medications for this encounter.     Current Outpatient Medications   Medication Sig Dispense Refill    predniSONE (DELTASONE) 20 MG tablet Take 1 tablet by mouth 2 times daily for 5 days 10 tablet 0    albuterol sulfate HFA (VENTOLIN HFA) 108 (90 Base) MCG/ACT inhaler Inhale 2 puffs into the lungs 4 times daily as needed for Wheezing 54 g 1    benzonatate (TESSALON) 100 MG capsule Take 1 capsule by mouth 2 times daily as needed for Cough 20 capsule 0    metFORMIN (GLUCOPHAGE) 500 MG tablet TAKE 1 TABLET BY MOUTH TWICE A DAY WITH A MEAL 180 tablet 1    amLODIPine (NORVASC) 5 MG tablet TAKE 1 TABLET BY MOUTH DAILY 90 tablet 0       Past History     Past Medical History:   Past Medical History:   Diagnosis Date    Asthma     Diabetes (HCC)     Hyperlipidemia     Hypertension     Urethral trauma, sequela        Past Surgical History:  Past Surgical History:   Procedure Laterality Date    CHOLECYSTECTOMY      HYSTERECTOMY (CERVIX STATUS UNKNOWN)         Family History:  Family History   Problem Relation Age of Onset    Cancer Father     Heart Attack Mother     Heart Disease Father     Colon Cancer Father     Diabetes Daughter     Heart Disease Mother        Social History:   Social History     Tobacco Use    Smoking status: Never     Passive

## 2025-03-02 NOTE — ED NOTES
Pt reports she had coughed up some mucous and \"I feel so much better like Im not even wheezing any more\"

## 2025-03-02 NOTE — DISCHARGE INSTRUCTIONS
You were seen in the emergency department for your cough and cold symptoms. You were negative for COVID and flu, but you were given a breathing treatment for wheezing. We have prescribed steroids for you to take for a few days as well as an inhaler and cough medicine to take as needed. Please follow up as scheduled with your PCP for blood pressure and diabetes management. Call your doctor or return to the emergency room if you have any new or worsening symptoms.

## 2025-03-14 ENCOUNTER — TRANSCRIBE ORDERS (OUTPATIENT)
Facility: HOSPITAL | Age: 85
End: 2025-03-14

## 2025-03-14 DIAGNOSIS — Z13.820 SCREENING FOR OSTEOPOROSIS: Primary | ICD-10-CM

## 2025-03-14 DIAGNOSIS — Z12.31 VISIT FOR SCREENING MAMMOGRAM: ICD-10-CM

## 2025-03-14 DIAGNOSIS — Z78.0 POST-MENOPAUSAL: ICD-10-CM
